# Patient Record
Sex: FEMALE | Race: WHITE | NOT HISPANIC OR LATINO
[De-identification: names, ages, dates, MRNs, and addresses within clinical notes are randomized per-mention and may not be internally consistent; named-entity substitution may affect disease eponyms.]

---

## 2017-09-26 ENCOUNTER — APPOINTMENT (OUTPATIENT)
Dept: OBGYN | Facility: CLINIC | Age: 52
End: 2017-09-26
Payer: COMMERCIAL

## 2017-09-26 VITALS
WEIGHT: 184 LBS | DIASTOLIC BLOOD PRESSURE: 80 MMHG | HEIGHT: 64 IN | SYSTOLIC BLOOD PRESSURE: 134 MMHG | BODY MASS INDEX: 31.41 KG/M2

## 2017-09-26 DIAGNOSIS — N63 UNSPECIFIED LUMP IN BREAST: ICD-10-CM

## 2017-09-26 DIAGNOSIS — N64.4 MASTODYNIA: ICD-10-CM

## 2017-09-26 PROCEDURE — 99396 PREV VISIT EST AGE 40-64: CPT

## 2017-09-26 PROCEDURE — 99214 OFFICE O/P EST MOD 30 MIN: CPT | Mod: 25

## 2017-09-28 ENCOUNTER — APPOINTMENT (OUTPATIENT)
Dept: MAMMOGRAPHY | Facility: CLINIC | Age: 52
End: 2017-09-28

## 2017-09-28 LAB — HPV HIGH+LOW RISK DNA PNL CVX: NEGATIVE

## 2017-09-29 ENCOUNTER — FORM ENCOUNTER (OUTPATIENT)
Age: 52
End: 2017-09-29

## 2017-09-30 ENCOUNTER — OUTPATIENT (OUTPATIENT)
Dept: OUTPATIENT SERVICES | Facility: HOSPITAL | Age: 52
LOS: 1 days | End: 2017-09-30
Payer: COMMERCIAL

## 2017-09-30 ENCOUNTER — APPOINTMENT (OUTPATIENT)
Dept: MAMMOGRAPHY | Facility: CLINIC | Age: 52
End: 2017-09-30
Payer: COMMERCIAL

## 2017-09-30 ENCOUNTER — APPOINTMENT (OUTPATIENT)
Dept: ULTRASOUND IMAGING | Facility: CLINIC | Age: 52
End: 2017-09-30
Payer: COMMERCIAL

## 2017-09-30 DIAGNOSIS — N63 UNSPECIFIED LUMP IN BREAST: ICD-10-CM

## 2017-09-30 DIAGNOSIS — Z00.8 ENCOUNTER FOR OTHER GENERAL EXAMINATION: ICD-10-CM

## 2017-09-30 DIAGNOSIS — N64.4 MASTODYNIA: ICD-10-CM

## 2017-09-30 PROCEDURE — G0279: CPT

## 2017-09-30 PROCEDURE — G0204: CPT | Mod: 26

## 2017-09-30 PROCEDURE — 76641 ULTRASOUND BREAST COMPLETE: CPT

## 2017-09-30 PROCEDURE — G0279: CPT | Mod: 26

## 2017-09-30 PROCEDURE — 76641 ULTRASOUND BREAST COMPLETE: CPT | Mod: 26,50

## 2017-09-30 PROCEDURE — 77066 DX MAMMO INCL CAD BI: CPT

## 2017-10-02 DIAGNOSIS — B00.9 HERPESVIRAL INFECTION, UNSPECIFIED: ICD-10-CM

## 2017-10-04 DIAGNOSIS — N60.01 SOLITARY CYST OF RIGHT BREAST: ICD-10-CM

## 2017-10-04 DIAGNOSIS — R92.8 OTHER ABNORMAL AND INCONCLUSIVE FINDINGS ON DIAGNOSTIC IMAGING OF BREAST: ICD-10-CM

## 2017-10-04 DIAGNOSIS — N64.4 MASTODYNIA: ICD-10-CM

## 2017-10-12 ENCOUNTER — APPOINTMENT (OUTPATIENT)
Dept: OBGYN | Facility: CLINIC | Age: 52
End: 2017-10-12
Payer: COMMERCIAL

## 2017-10-12 VITALS
HEIGHT: 64 IN | BODY MASS INDEX: 31.58 KG/M2 | DIASTOLIC BLOOD PRESSURE: 80 MMHG | WEIGHT: 185 LBS | SYSTOLIC BLOOD PRESSURE: 130 MMHG

## 2017-10-12 DIAGNOSIS — N92.0 EXCESSIVE AND FREQUENT MENSTRUATION WITH REGULAR CYCLE: ICD-10-CM

## 2017-10-12 LAB — CYTOLOGY CVX/VAG DOC THIN PREP: NORMAL

## 2017-10-12 PROCEDURE — 99213 OFFICE O/P EST LOW 20 MIN: CPT | Mod: 25

## 2017-10-12 PROCEDURE — 58100 BIOPSY OF UTERUS LINING: CPT

## 2017-10-12 PROCEDURE — 76857 US EXAM PELVIC LIMITED: CPT

## 2017-10-12 PROCEDURE — 76830 TRANSVAGINAL US NON-OB: CPT

## 2017-10-16 LAB — CORE LAB BIOPSY: NORMAL

## 2017-10-20 ENCOUNTER — OUTPATIENT (OUTPATIENT)
Dept: OUTPATIENT SERVICES | Facility: HOSPITAL | Age: 52
LOS: 1 days | End: 2017-10-20
Payer: COMMERCIAL

## 2017-10-20 VITALS
WEIGHT: 184.53 LBS | TEMPERATURE: 100 F | DIASTOLIC BLOOD PRESSURE: 81 MMHG | HEART RATE: 82 BPM | HEIGHT: 64 IN | SYSTOLIC BLOOD PRESSURE: 133 MMHG | RESPIRATION RATE: 16 BRPM

## 2017-10-20 DIAGNOSIS — N92.0 EXCESSIVE AND FREQUENT MENSTRUATION WITH REGULAR CYCLE: ICD-10-CM

## 2017-10-20 DIAGNOSIS — Z90.49 ACQUIRED ABSENCE OF OTHER SPECIFIED PARTS OF DIGESTIVE TRACT: Chronic | ICD-10-CM

## 2017-10-20 DIAGNOSIS — Z01.818 ENCOUNTER FOR OTHER PREPROCEDURAL EXAMINATION: ICD-10-CM

## 2017-10-20 DIAGNOSIS — Z98.890 OTHER SPECIFIED POSTPROCEDURAL STATES: Chronic | ICD-10-CM

## 2017-10-20 DIAGNOSIS — I10 ESSENTIAL (PRIMARY) HYPERTENSION: ICD-10-CM

## 2017-10-20 DIAGNOSIS — E03.9 HYPOTHYROIDISM, UNSPECIFIED: ICD-10-CM

## 2017-10-20 LAB
ANION GAP SERPL CALC-SCNC: 12 MMOL/L — SIGNIFICANT CHANGE UP (ref 5–17)
APTT BLD: 28.2 SEC — SIGNIFICANT CHANGE UP (ref 27.5–37.4)
BUN SERPL-MCNC: 14 MG/DL — SIGNIFICANT CHANGE UP (ref 8–20)
CALCIUM SERPL-MCNC: 9 MG/DL — SIGNIFICANT CHANGE UP (ref 8.6–10.2)
CHLORIDE SERPL-SCNC: 102 MMOL/L — SIGNIFICANT CHANGE UP (ref 98–107)
CO2 SERPL-SCNC: 28 MMOL/L — SIGNIFICANT CHANGE UP (ref 22–29)
CREAT SERPL-MCNC: 0.72 MG/DL — SIGNIFICANT CHANGE UP (ref 0.5–1.3)
GLUCOSE SERPL-MCNC: 100 MG/DL — SIGNIFICANT CHANGE UP (ref 70–115)
HCT VFR BLD CALC: 39 % — SIGNIFICANT CHANGE UP (ref 37–47)
HGB BLD-MCNC: 13.1 G/DL — SIGNIFICANT CHANGE UP (ref 12–16)
INR BLD: 1.03 RATIO — SIGNIFICANT CHANGE UP (ref 0.88–1.16)
MCHC RBC-ENTMCNC: 28.1 PG — SIGNIFICANT CHANGE UP (ref 27–31)
MCHC RBC-ENTMCNC: 33.6 G/DL — SIGNIFICANT CHANGE UP (ref 32–36)
MCV RBC AUTO: 83.5 FL — SIGNIFICANT CHANGE UP (ref 81–99)
PLATELET # BLD AUTO: 455 K/UL — HIGH (ref 150–400)
POTASSIUM SERPL-MCNC: 3.4 MMOL/L — LOW (ref 3.5–5.3)
POTASSIUM SERPL-SCNC: 3.4 MMOL/L — LOW (ref 3.5–5.3)
PROTHROM AB SERPL-ACNC: 11.3 SEC — SIGNIFICANT CHANGE UP (ref 9.8–12.7)
RBC # BLD: 4.67 M/UL — SIGNIFICANT CHANGE UP (ref 4.4–5.2)
RBC # FLD: 13.4 % — SIGNIFICANT CHANGE UP (ref 11–15.6)
SODIUM SERPL-SCNC: 142 MMOL/L — SIGNIFICANT CHANGE UP (ref 135–145)
WBC # BLD: 8.6 K/UL — SIGNIFICANT CHANGE UP (ref 4.8–10.8)
WBC # FLD AUTO: 8.6 K/UL — SIGNIFICANT CHANGE UP (ref 4.8–10.8)

## 2017-10-20 PROCEDURE — 80048 BASIC METABOLIC PNL TOTAL CA: CPT

## 2017-10-20 PROCEDURE — 85610 PROTHROMBIN TIME: CPT

## 2017-10-20 PROCEDURE — 85730 THROMBOPLASTIN TIME PARTIAL: CPT

## 2017-10-20 PROCEDURE — 93005 ELECTROCARDIOGRAM TRACING: CPT

## 2017-10-20 PROCEDURE — 36415 COLL VENOUS BLD VENIPUNCTURE: CPT

## 2017-10-20 PROCEDURE — 85027 COMPLETE CBC AUTOMATED: CPT

## 2017-10-20 PROCEDURE — 93010 ELECTROCARDIOGRAM REPORT: CPT

## 2017-10-20 PROCEDURE — G0463: CPT

## 2017-10-20 RX ORDER — SODIUM CHLORIDE 9 MG/ML
3 INJECTION INTRAMUSCULAR; INTRAVENOUS; SUBCUTANEOUS ONCE
Qty: 0 | Refills: 0 | Status: DISCONTINUED | OUTPATIENT
Start: 2017-11-01 | End: 2017-11-16

## 2017-10-20 NOTE — H&P PST ADULT - RS GEN PE MLT RESP DETAILS PC
normal/diminished breath sounds, R/airway patent/respirations non-labored/diminished breath sounds, L

## 2017-10-20 NOTE — H&P PST ADULT - NSANTHOSAYNRD_GEN_A_CORE
No. SAMMIE screening performed.  STOP BANG Legend: 0-2 = LOW Risk; 3-4 = INTERMEDIATE Risk; 5-8 = HIGH Risk

## 2017-10-20 NOTE — ASU PATIENT PROFILE, ADULT - VISION (WITH CORRECTIVE LENSES IF THE PATIENT USUALLY WEARS THEM):
Partially impaired: cannot see medication labels or newsprint, but can see obstacles in path, and the surrounding layout; can count fingers at arm's length/wears glasses  contacts

## 2017-10-20 NOTE — H&P PST ADULT - PMH
Anemia    Asthma    Gastric ulcer    Hypertension    Hypothyroidism    Risk factors for obstructive sleep apnea

## 2017-11-01 ENCOUNTER — OUTPATIENT (OUTPATIENT)
Dept: OUTPATIENT SERVICES | Facility: HOSPITAL | Age: 52
LOS: 1 days | End: 2017-11-01
Payer: COMMERCIAL

## 2017-11-01 ENCOUNTER — RESULT REVIEW (OUTPATIENT)
Age: 52
End: 2017-11-01

## 2017-11-01 VITALS
OXYGEN SATURATION: 99 % | HEART RATE: 68 BPM | DIASTOLIC BLOOD PRESSURE: 78 MMHG | RESPIRATION RATE: 16 BRPM | SYSTOLIC BLOOD PRESSURE: 154 MMHG | WEIGHT: 184.09 LBS | TEMPERATURE: 98 F | HEIGHT: 64 IN

## 2017-11-01 VITALS
TEMPERATURE: 98 F | SYSTOLIC BLOOD PRESSURE: 130 MMHG | RESPIRATION RATE: 16 BRPM | DIASTOLIC BLOOD PRESSURE: 65 MMHG | HEART RATE: 80 BPM | OXYGEN SATURATION: 100 %

## 2017-11-01 DIAGNOSIS — Z98.890 OTHER SPECIFIED POSTPROCEDURAL STATES: Chronic | ICD-10-CM

## 2017-11-01 DIAGNOSIS — Z90.49 ACQUIRED ABSENCE OF OTHER SPECIFIED PARTS OF DIGESTIVE TRACT: Chronic | ICD-10-CM

## 2017-11-01 DIAGNOSIS — N92.0 EXCESSIVE AND FREQUENT MENSTRUATION WITH REGULAR CYCLE: ICD-10-CM

## 2017-11-01 PROCEDURE — 88305 TISSUE EXAM BY PATHOLOGIST: CPT | Mod: 26

## 2017-11-01 PROCEDURE — 58563 HYSTEROSCOPY ABLATION: CPT

## 2017-11-01 PROCEDURE — 88305 TISSUE EXAM BY PATHOLOGIST: CPT

## 2017-11-01 RX ORDER — FENTANYL CITRATE 50 UG/ML
50 INJECTION INTRAVENOUS
Qty: 0 | Refills: 0 | Status: DISCONTINUED | OUTPATIENT
Start: 2017-11-01 | End: 2017-11-01

## 2017-11-01 RX ORDER — LEVOTHYROXINE SODIUM 125 MCG
1 TABLET ORAL
Qty: 0 | Refills: 0 | COMMUNITY

## 2017-11-01 RX ORDER — ALBUTEROL 90 UG/1
0 AEROSOL, METERED ORAL
Qty: 0 | Refills: 0 | COMMUNITY

## 2017-11-01 RX ORDER — FLUTICASONE PROPIONATE AND SALMETEROL 50; 250 UG/1; UG/1
0 POWDER ORAL; RESPIRATORY (INHALATION)
Qty: 0 | Refills: 0 | COMMUNITY

## 2017-11-01 RX ORDER — AMLODIPINE BESYLATE 2.5 MG/1
0 TABLET ORAL
Qty: 0 | Refills: 0 | COMMUNITY

## 2017-11-01 RX ORDER — ONDANSETRON 8 MG/1
4 TABLET, FILM COATED ORAL ONCE
Qty: 0 | Refills: 0 | Status: DISCONTINUED | OUTPATIENT
Start: 2017-11-01 | End: 2017-11-01

## 2017-11-01 RX ORDER — SODIUM CHLORIDE 9 MG/ML
1000 INJECTION INTRAMUSCULAR; INTRAVENOUS; SUBCUTANEOUS
Qty: 0 | Refills: 0 | Status: DISCONTINUED | OUTPATIENT
Start: 2017-11-01 | End: 2017-11-01

## 2017-11-01 NOTE — ASU DISCHARGE PLAN (ADULT/PEDIATRIC). - NOTIFY
Bleeding that does not stop/GYN Fever>100.4/Fever greater than 101/Inability to Tolerate Liquids or Foods

## 2017-11-01 NOTE — BRIEF OPERATIVE NOTE - PROCEDURE
<<-----Click on this checkbox to enter Procedure Hysteroscopy with ablation of endometrium using bipolar electrosurgical system  11/01/2017  with dilation and currettage  Active  [email or web address]  Endometrial ablation  11/01/2017    Active  [email or web address]

## 2017-11-01 NOTE — ASU DISCHARGE PLAN (ADULT/PEDIATRIC). - MEDICATION SUMMARY - MEDICATIONS TO TAKE
I will START or STAY ON the medications listed below when I get home from the hospital:    Percocet 5/325 oral tablet  -- 1 tab(s) by mouth every 6 hours MDD:4 tabs  -- Caution federal law prohibits the transfer of this drug to any person other  than the person for whom it was prescribed.  May cause drowsiness.  Alcohol may intensify this effect.  Use care when operating dangerous machinery.  This prescription cannot be refilled.  This product contains acetaminophen.  Do not use  with any other product containing acetaminophen to prevent possible liver damage.  Using more of this medication than prescribed may cause serious breathing problems.    -- Indication: For pain

## 2017-11-02 ENCOUNTER — TRANSCRIPTION ENCOUNTER (OUTPATIENT)
Age: 52
End: 2017-11-02

## 2017-11-03 DIAGNOSIS — N71.9 INFLAMMATORY DISEASE OF UTERUS, UNSPECIFIED: ICD-10-CM

## 2017-11-06 LAB — SURGICAL PATHOLOGY FINAL REPORT - CH: SIGNIFICANT CHANGE UP

## 2017-11-16 ENCOUNTER — APPOINTMENT (OUTPATIENT)
Dept: OBGYN | Facility: CLINIC | Age: 52
End: 2017-11-16
Payer: COMMERCIAL

## 2017-11-16 VITALS
SYSTOLIC BLOOD PRESSURE: 130 MMHG | HEIGHT: 64 IN | DIASTOLIC BLOOD PRESSURE: 70 MMHG | BODY MASS INDEX: 31.41 KG/M2 | WEIGHT: 184 LBS

## 2017-11-16 PROCEDURE — 99024 POSTOP FOLLOW-UP VISIT: CPT

## 2018-08-16 PROBLEM — E03.9 HYPOTHYROIDISM, UNSPECIFIED: Chronic | Status: ACTIVE | Noted: 2017-10-20

## 2018-08-16 PROBLEM — K25.9 GASTRIC ULCER, UNSPECIFIED AS ACUTE OR CHRONIC, WITHOUT HEMORRHAGE OR PERFORATION: Chronic | Status: ACTIVE | Noted: 2017-10-20

## 2018-08-16 PROBLEM — I10 ESSENTIAL (PRIMARY) HYPERTENSION: Chronic | Status: ACTIVE | Noted: 2017-10-20

## 2018-08-16 PROBLEM — Z91.89 OTHER SPECIFIED PERSONAL RISK FACTORS, NOT ELSEWHERE CLASSIFIED: Chronic | Status: ACTIVE | Noted: 2017-10-20

## 2018-08-16 PROBLEM — J45.909 UNSPECIFIED ASTHMA, UNCOMPLICATED: Chronic | Status: ACTIVE | Noted: 2017-10-20

## 2018-08-16 PROBLEM — D64.9 ANEMIA, UNSPECIFIED: Chronic | Status: ACTIVE | Noted: 2017-10-20

## 2018-08-17 ENCOUNTER — APPOINTMENT (OUTPATIENT)
Dept: OBGYN | Facility: CLINIC | Age: 53
End: 2018-08-17
Payer: COMMERCIAL

## 2018-08-17 VITALS
WEIGHT: 184 LBS | SYSTOLIC BLOOD PRESSURE: 120 MMHG | DIASTOLIC BLOOD PRESSURE: 80 MMHG | HEIGHT: 64 IN | BODY MASS INDEX: 31.41 KG/M2

## 2018-08-17 DIAGNOSIS — D25.9 LEIOMYOMA OF UTERUS, UNSPECIFIED: ICD-10-CM

## 2018-08-17 PROCEDURE — 99213 OFFICE O/P EST LOW 20 MIN: CPT

## 2018-08-28 ENCOUNTER — APPOINTMENT (OUTPATIENT)
Dept: OBGYN | Facility: CLINIC | Age: 53
End: 2018-08-28
Payer: COMMERCIAL

## 2018-08-28 ENCOUNTER — MEDICATION RENEWAL (OUTPATIENT)
Age: 53
End: 2018-08-28

## 2018-08-28 VITALS
BODY MASS INDEX: 31.41 KG/M2 | SYSTOLIC BLOOD PRESSURE: 120 MMHG | HEIGHT: 64 IN | WEIGHT: 184 LBS | DIASTOLIC BLOOD PRESSURE: 80 MMHG

## 2018-08-28 PROCEDURE — 99213 OFFICE O/P EST LOW 20 MIN: CPT

## 2018-09-04 LAB — BACTERIA GENITAL AEROBE CULT: ABNORMAL

## 2018-09-26 ENCOUNTER — APPOINTMENT (OUTPATIENT)
Dept: OBGYN | Facility: CLINIC | Age: 53
End: 2018-09-26

## 2018-10-03 ENCOUNTER — FORM ENCOUNTER (OUTPATIENT)
Age: 53
End: 2018-10-03

## 2018-10-03 ENCOUNTER — APPOINTMENT (OUTPATIENT)
Dept: OBGYN | Facility: CLINIC | Age: 53
End: 2018-10-03
Payer: COMMERCIAL

## 2018-10-03 VITALS
WEIGHT: 184 LBS | DIASTOLIC BLOOD PRESSURE: 74 MMHG | HEIGHT: 64 IN | SYSTOLIC BLOOD PRESSURE: 126 MMHG | BODY MASS INDEX: 31.41 KG/M2

## 2018-10-03 DIAGNOSIS — A63.0 ANOGENITAL (VENEREAL) WARTS: ICD-10-CM

## 2018-10-03 PROCEDURE — 99396 PREV VISIT EST AGE 40-64: CPT

## 2018-10-03 PROCEDURE — 82270 OCCULT BLOOD FECES: CPT

## 2018-10-04 ENCOUNTER — APPOINTMENT (OUTPATIENT)
Dept: MAMMOGRAPHY | Facility: CLINIC | Age: 53
End: 2018-10-04
Payer: COMMERCIAL

## 2018-10-04 ENCOUNTER — OUTPATIENT (OUTPATIENT)
Dept: OUTPATIENT SERVICES | Facility: HOSPITAL | Age: 53
LOS: 1 days | End: 2018-10-04
Payer: COMMERCIAL

## 2018-10-04 DIAGNOSIS — Z98.890 OTHER SPECIFIED POSTPROCEDURAL STATES: Chronic | ICD-10-CM

## 2018-10-04 DIAGNOSIS — Z90.49 ACQUIRED ABSENCE OF OTHER SPECIFIED PARTS OF DIGESTIVE TRACT: Chronic | ICD-10-CM

## 2018-10-04 DIAGNOSIS — Z00.00 ENCOUNTER FOR GENERAL ADULT MEDICAL EXAMINATION WITHOUT ABNORMAL FINDINGS: ICD-10-CM

## 2018-10-04 PROCEDURE — 77063 BREAST TOMOSYNTHESIS BI: CPT | Mod: 26

## 2018-10-04 PROCEDURE — 77067 SCR MAMMO BI INCL CAD: CPT

## 2018-10-04 PROCEDURE — 77063 BREAST TOMOSYNTHESIS BI: CPT

## 2018-10-04 PROCEDURE — 77067 SCR MAMMO BI INCL CAD: CPT | Mod: 26

## 2018-10-05 LAB
C TRACH RRNA SPEC QL NAA+PROBE: NOT DETECTED
HPV HIGH+LOW RISK DNA PNL CVX: DETECTED
N GONORRHOEA RRNA SPEC QL NAA+PROBE: NOT DETECTED
SOURCE AMPLIFICATION: NORMAL
SOURCE TP AMPLIFICATION: NORMAL
T VAGINALIS RRNA SPEC QL NAA+PROBE: NOT DETECTED

## 2018-10-08 LAB — CYTOLOGY CVX/VAG DOC THIN PREP: NORMAL

## 2018-11-05 ENCOUNTER — APPOINTMENT (OUTPATIENT)
Dept: OBGYN | Facility: CLINIC | Age: 53
End: 2018-11-05
Payer: COMMERCIAL

## 2018-11-05 VITALS
DIASTOLIC BLOOD PRESSURE: 74 MMHG | HEIGHT: 64 IN | SYSTOLIC BLOOD PRESSURE: 130 MMHG | BODY MASS INDEX: 31.41 KG/M2 | WEIGHT: 184 LBS

## 2018-11-05 PROCEDURE — 99214 OFFICE O/P EST MOD 30 MIN: CPT

## 2019-04-05 ENCOUNTER — APPOINTMENT (OUTPATIENT)
Dept: OBGYN | Facility: CLINIC | Age: 54
End: 2019-04-05
Payer: COMMERCIAL

## 2019-04-05 VITALS
HEIGHT: 64 IN | SYSTOLIC BLOOD PRESSURE: 130 MMHG | DIASTOLIC BLOOD PRESSURE: 80 MMHG | WEIGHT: 184 LBS | BODY MASS INDEX: 31.41 KG/M2

## 2019-04-05 PROCEDURE — 99213 OFFICE O/P EST LOW 20 MIN: CPT

## 2019-04-10 LAB
CYTOLOGY CVX/VAG DOC THIN PREP: NORMAL
HPV HIGH+LOW RISK DNA PNL CVX: NOT DETECTED

## 2019-10-21 ENCOUNTER — APPOINTMENT (OUTPATIENT)
Dept: OBGYN | Facility: CLINIC | Age: 54
End: 2019-10-21
Payer: COMMERCIAL

## 2019-10-21 VITALS
SYSTOLIC BLOOD PRESSURE: 125 MMHG | HEIGHT: 64 IN | DIASTOLIC BLOOD PRESSURE: 80 MMHG | BODY MASS INDEX: 31.41 KG/M2 | WEIGHT: 184 LBS

## 2019-10-21 DIAGNOSIS — Z00.00 ENCOUNTER FOR GENERAL ADULT MEDICAL EXAMINATION W/OUT ABNORMAL FINDINGS: ICD-10-CM

## 2019-10-21 DIAGNOSIS — R21 RASH AND OTHER NONSPECIFIC SKIN ERUPTION: ICD-10-CM

## 2019-10-21 DIAGNOSIS — Z86.19 PERSONAL HISTORY OF OTHER INFECTIOUS AND PARASITIC DISEASES: ICD-10-CM

## 2019-10-21 PROCEDURE — 99214 OFFICE O/P EST MOD 30 MIN: CPT | Mod: 25

## 2019-10-21 PROCEDURE — 99396 PREV VISIT EST AGE 40-64: CPT

## 2019-10-21 NOTE — PHYSICAL EXAM
[Awake] : awake [Alert] : alert [Acute Distress] : no acute distress [Mass] : no breast mass [Nipple Discharge] : no nipple discharge [Axillary LAD] : no axillary lymphadenopathy [Soft] : soft [Oriented x3] : oriented to person, place, and time [Tender] : non tender [Normal] : vagina [No Bleeding] : there was no active vaginal bleeding [Uterine Adnexae] : were not tender and not enlarged [Occult Blood] : occult blood test from digital rectal exam was negative [CTAB] : CTAB [RRR, No Murmurs] : RRR, no murmurs [de-identified] : tender, erythematous nodule left buttock (cultured)

## 2019-10-24 LAB
BACTERIA SPEC CULT: NORMAL
HPV HIGH+LOW RISK DNA PNL CVX: NOT DETECTED
HSV+VZV DNA SPEC QL NAA+PROBE: NOT DETECTED
SPECIMEN SOURCE: NORMAL

## 2019-10-28 LAB — CYTOLOGY CVX/VAG DOC THIN PREP: NORMAL

## 2019-12-19 ENCOUNTER — APPOINTMENT (OUTPATIENT)
Dept: OPHTHALMOLOGY | Facility: CLINIC | Age: 54
End: 2019-12-19
Payer: COMMERCIAL

## 2019-12-19 ENCOUNTER — NON-APPOINTMENT (OUTPATIENT)
Age: 54
End: 2019-12-19

## 2019-12-19 PROCEDURE — 92002 INTRM OPH EXAM NEW PATIENT: CPT

## 2019-12-26 ENCOUNTER — NON-APPOINTMENT (OUTPATIENT)
Age: 54
End: 2019-12-26

## 2019-12-26 ENCOUNTER — APPOINTMENT (OUTPATIENT)
Dept: OPHTHALMOLOGY | Facility: CLINIC | Age: 54
End: 2019-12-26
Payer: COMMERCIAL

## 2019-12-26 PROCEDURE — 92014 COMPRE OPH EXAM EST PT 1/>: CPT

## 2020-01-06 ENCOUNTER — APPOINTMENT (OUTPATIENT)
Dept: OBGYN | Facility: CLINIC | Age: 55
End: 2020-01-06
Payer: COMMERCIAL

## 2020-01-06 VITALS
BODY MASS INDEX: 31.24 KG/M2 | HEIGHT: 64 IN | DIASTOLIC BLOOD PRESSURE: 70 MMHG | SYSTOLIC BLOOD PRESSURE: 114 MMHG | WEIGHT: 183 LBS

## 2020-01-06 PROCEDURE — 99213 OFFICE O/P EST LOW 20 MIN: CPT

## 2020-11-05 ENCOUNTER — NON-APPOINTMENT (OUTPATIENT)
Age: 55
End: 2020-11-05

## 2020-11-05 ENCOUNTER — APPOINTMENT (OUTPATIENT)
Dept: OPHTHALMOLOGY | Facility: CLINIC | Age: 55
End: 2020-11-05
Payer: COMMERCIAL

## 2020-11-05 PROCEDURE — 99072 ADDL SUPL MATRL&STAF TM PHE: CPT

## 2020-11-05 PROCEDURE — 92012 INTRM OPH EXAM EST PATIENT: CPT

## 2020-11-12 ENCOUNTER — APPOINTMENT (OUTPATIENT)
Dept: OBGYN | Facility: CLINIC | Age: 55
End: 2020-11-12
Payer: COMMERCIAL

## 2020-11-12 VITALS
HEIGHT: 64 IN | WEIGHT: 188 LBS | BODY MASS INDEX: 32.1 KG/M2 | SYSTOLIC BLOOD PRESSURE: 117 MMHG | HEART RATE: 84 BPM | DIASTOLIC BLOOD PRESSURE: 76 MMHG

## 2020-11-12 DIAGNOSIS — Z12.39 ENCOUNTER FOR OTHER SCREENING FOR MALIGNANT NEOPLASM OF BREAST: ICD-10-CM

## 2020-11-12 PROCEDURE — 99396 PREV VISIT EST AGE 40-64: CPT

## 2020-11-12 PROCEDURE — 82270 OCCULT BLOOD FECES: CPT

## 2020-11-12 PROCEDURE — 99072 ADDL SUPL MATRL&STAF TM PHE: CPT

## 2020-11-12 NOTE — HISTORY OF PRESENT ILLNESS
[FreeTextEntry1] : 56 yo here for av., Pt has hot flashes, sleep issues,  she uses lotrison occ. SHe has been working in her car a lot  during covid. Pt gets leg cramps. [Patient reported mammogram was normal] : Patient reported mammogram was normal [Patient reported PAP Smear was normal] : Patient reported PAP Smear was normal [Mammogramdate] : 2018 [PapSmeardate] : 2019 [BoneDensityDate] : never [ColonoscopyDate] : yrs ago

## 2020-11-12 NOTE — PHYSICAL EXAM

## 2020-11-12 NOTE — DISCUSSION/SUMMARY
[FreeTextEntry1] : Discussed with the pt the importance of exercise weight bearing,yoga, aerobics good variety, \par calcium totalling  mg between food and vitamins also vitamin D 2000iu daily, fish oil. \par ALso that any drop of blood after menapause is not good. \par FU in one year. \par  Discussed vaginal lubricants OTC like luvena moisturizers,ky ovule and relplense . There are also vaginal extrogens, and coconut oil with intercourse.\par discussed colonoscopy and derma appt.\par Pt will start the HT\par

## 2020-11-14 LAB — HPV HIGH+LOW RISK DNA PNL CVX: NOT DETECTED

## 2020-11-16 LAB — CYTOLOGY CVX/VAG DOC THIN PREP: ABNORMAL

## 2020-11-23 ENCOUNTER — NON-APPOINTMENT (OUTPATIENT)
Age: 55
End: 2020-11-23

## 2020-11-23 ENCOUNTER — APPOINTMENT (OUTPATIENT)
Dept: OPHTHALMOLOGY | Facility: CLINIC | Age: 55
End: 2020-11-23
Payer: COMMERCIAL

## 2020-11-23 PROCEDURE — 68761 CLOSE TEAR DUCT OPENING: CPT | Mod: E2,E4

## 2020-11-23 PROCEDURE — 92012 INTRM OPH EXAM EST PATIENT: CPT | Mod: 25

## 2020-12-21 PROBLEM — Z86.19 HISTORY OF CANDIDIASIS OF VAGINA: Status: RESOLVED | Noted: 2017-11-16 | Resolved: 2020-12-21

## 2020-12-22 ENCOUNTER — APPOINTMENT (OUTPATIENT)
Dept: OPHTHALMOLOGY | Facility: CLINIC | Age: 55
End: 2020-12-22
Payer: COMMERCIAL

## 2020-12-22 ENCOUNTER — NON-APPOINTMENT (OUTPATIENT)
Age: 55
End: 2020-12-22

## 2020-12-22 PROCEDURE — 92012 INTRM OPH EXAM EST PATIENT: CPT

## 2020-12-22 PROCEDURE — 99072 ADDL SUPL MATRL&STAF TM PHE: CPT

## 2021-02-22 ENCOUNTER — APPOINTMENT (OUTPATIENT)
Dept: OPHTHALMOLOGY | Facility: CLINIC | Age: 56
End: 2021-02-22

## 2021-03-03 ENCOUNTER — RESULT REVIEW (OUTPATIENT)
Age: 56
End: 2021-03-03

## 2021-03-03 ENCOUNTER — APPOINTMENT (OUTPATIENT)
Dept: MAMMOGRAPHY | Facility: CLINIC | Age: 56
End: 2021-03-03
Payer: COMMERCIAL

## 2021-03-03 ENCOUNTER — APPOINTMENT (OUTPATIENT)
Dept: ULTRASOUND IMAGING | Facility: CLINIC | Age: 56
End: 2021-03-03
Payer: COMMERCIAL

## 2021-03-03 ENCOUNTER — OUTPATIENT (OUTPATIENT)
Dept: OUTPATIENT SERVICES | Facility: HOSPITAL | Age: 56
LOS: 1 days | End: 2021-03-03
Payer: COMMERCIAL

## 2021-03-03 ENCOUNTER — APPOINTMENT (OUTPATIENT)
Dept: RADIOLOGY | Facility: CLINIC | Age: 56
End: 2021-03-03
Payer: COMMERCIAL

## 2021-03-03 DIAGNOSIS — Z13.820 ENCOUNTER FOR SCREENING FOR OSTEOPOROSIS: ICD-10-CM

## 2021-03-03 DIAGNOSIS — Z00.8 ENCOUNTER FOR OTHER GENERAL EXAMINATION: ICD-10-CM

## 2021-03-03 DIAGNOSIS — Z90.49 ACQUIRED ABSENCE OF OTHER SPECIFIED PARTS OF DIGESTIVE TRACT: Chronic | ICD-10-CM

## 2021-03-03 DIAGNOSIS — Z98.890 OTHER SPECIFIED POSTPROCEDURAL STATES: Chronic | ICD-10-CM

## 2021-03-03 DIAGNOSIS — R92.8 OTHER ABNORMAL AND INCONCLUSIVE FINDINGS ON DIAGNOSTIC IMAGING OF BREAST: ICD-10-CM

## 2021-03-03 PROCEDURE — 77067 SCR MAMMO BI INCL CAD: CPT

## 2021-03-03 PROCEDURE — 77067 SCR MAMMO BI INCL CAD: CPT | Mod: 26

## 2021-03-03 PROCEDURE — 77063 BREAST TOMOSYNTHESIS BI: CPT | Mod: 26

## 2021-03-03 PROCEDURE — 77063 BREAST TOMOSYNTHESIS BI: CPT

## 2021-03-03 PROCEDURE — 76641 ULTRASOUND BREAST COMPLETE: CPT

## 2021-03-03 PROCEDURE — 77080 DXA BONE DENSITY AXIAL: CPT | Mod: 26

## 2021-03-03 PROCEDURE — 76641 ULTRASOUND BREAST COMPLETE: CPT | Mod: 26,50

## 2021-03-03 PROCEDURE — 77080 DXA BONE DENSITY AXIAL: CPT

## 2021-06-10 ENCOUNTER — APPOINTMENT (OUTPATIENT)
Dept: OPHTHALMOLOGY | Facility: CLINIC | Age: 56
End: 2021-06-10
Payer: COMMERCIAL

## 2021-06-10 ENCOUNTER — NON-APPOINTMENT (OUTPATIENT)
Age: 56
End: 2021-06-10

## 2021-06-10 PROCEDURE — 92012 INTRM OPH EXAM EST PATIENT: CPT | Mod: 25

## 2021-06-10 PROCEDURE — 68761 CLOSE TEAR DUCT OPENING: CPT | Mod: E2,E4

## 2021-06-10 PROCEDURE — 99072 ADDL SUPL MATRL&STAF TM PHE: CPT

## 2021-07-12 ENCOUNTER — APPOINTMENT (OUTPATIENT)
Dept: OPHTHALMOLOGY | Facility: CLINIC | Age: 56
End: 2021-07-12
Payer: COMMERCIAL

## 2021-07-12 ENCOUNTER — NON-APPOINTMENT (OUTPATIENT)
Age: 56
End: 2021-07-12

## 2021-07-12 PROCEDURE — 99072 ADDL SUPL MATRL&STAF TM PHE: CPT

## 2021-07-12 PROCEDURE — 92014 COMPRE OPH EXAM EST PT 1/>: CPT

## 2021-11-23 ENCOUNTER — NON-APPOINTMENT (OUTPATIENT)
Age: 56
End: 2021-11-23

## 2021-11-23 ENCOUNTER — APPOINTMENT (OUTPATIENT)
Dept: OPHTHALMOLOGY | Facility: CLINIC | Age: 56
End: 2021-11-23
Payer: COMMERCIAL

## 2021-11-23 PROCEDURE — 92012 INTRM OPH EXAM EST PATIENT: CPT | Mod: 25

## 2021-11-23 PROCEDURE — 68761 CLOSE TEAR DUCT OPENING: CPT | Mod: E2,E4

## 2022-01-11 ENCOUNTER — NON-APPOINTMENT (OUTPATIENT)
Age: 57
End: 2022-01-11

## 2022-01-11 ENCOUNTER — APPOINTMENT (OUTPATIENT)
Dept: OBGYN | Facility: CLINIC | Age: 57
End: 2022-01-11
Payer: COMMERCIAL

## 2022-01-11 VITALS
SYSTOLIC BLOOD PRESSURE: 128 MMHG | BODY MASS INDEX: 31.92 KG/M2 | HEART RATE: 80 BPM | WEIGHT: 187 LBS | HEIGHT: 64 IN | DIASTOLIC BLOOD PRESSURE: 79 MMHG

## 2022-01-11 DIAGNOSIS — N95.1 MENOPAUSAL AND FEMALE CLIMACTERIC STATES: ICD-10-CM

## 2022-01-11 DIAGNOSIS — Z12.11 ENCOUNTER FOR SCREENING FOR MALIGNANT NEOPLASM OF COLON: ICD-10-CM

## 2022-01-11 DIAGNOSIS — B97.7 PAPILLOMAVIRUS AS THE CAUSE OF DISEASES CLASSIFIED ELSEWHERE: ICD-10-CM

## 2022-01-11 PROCEDURE — 99396 PREV VISIT EST AGE 40-64: CPT

## 2022-01-11 PROCEDURE — 82270 OCCULT BLOOD FECES: CPT

## 2022-01-11 NOTE — PHYSICAL EXAM

## 2022-01-11 NOTE — DISCUSSION/SUMMARY
[FreeTextEntry1] : Discussed with the pt the importance of exercise weight bearing,yoga, aerobics good variety, \par calcium totalling  mg between food and vitamins also vitamin D 2000iu daily, fish oil. \par ALso that any drop of blood after menapause is not good. \par FU in one year. \par .\par discussed colonoscopy and derma appt.\par refill prempro rbad. \par

## 2022-01-11 NOTE — HISTORY OF PRESENT ILLNESS
[Currently Active] : currently active [Men] : men [Vaginal] : vaginal [No] : No [FreeTextEntry1] : 55 yo  was on prempro about 6 weeks   ran out  also got booster 2 weeks ago. Since booster she has hot flashes and headaches every morning and hot flashes at night. 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  uses valtrex [Mammogramdate] : 3/2021 [BreastSonogramDate] : 3/2021 [ColonoscopyDate] : ?50

## 2022-01-13 LAB — HPV HIGH+LOW RISK DNA PNL CVX: NOT DETECTED

## 2022-01-18 LAB — CYTOLOGY CVX/VAG DOC THIN PREP: NORMAL

## 2022-03-24 ENCOUNTER — NON-APPOINTMENT (OUTPATIENT)
Age: 57
End: 2022-03-24

## 2022-03-24 ENCOUNTER — APPOINTMENT (OUTPATIENT)
Dept: OPHTHALMOLOGY | Facility: CLINIC | Age: 57
End: 2022-03-24
Payer: COMMERCIAL

## 2022-03-24 PROCEDURE — 92012 INTRM OPH EXAM EST PATIENT: CPT | Mod: 25

## 2022-03-24 PROCEDURE — 68761 CLOSE TEAR DUCT OPENING: CPT | Mod: E1,E2,E3,E4

## 2022-07-26 ENCOUNTER — NON-APPOINTMENT (OUTPATIENT)
Age: 57
End: 2022-07-26

## 2022-07-26 ENCOUNTER — APPOINTMENT (OUTPATIENT)
Dept: OPHTHALMOLOGY | Facility: CLINIC | Age: 57
End: 2022-07-26

## 2022-07-26 PROCEDURE — 92014 COMPRE OPH EXAM EST PT 1/>: CPT | Mod: 25

## 2022-07-26 PROCEDURE — 68761 CLOSE TEAR DUCT OPENING: CPT

## 2022-09-10 ENCOUNTER — EMERGENCY (EMERGENCY)
Facility: HOSPITAL | Age: 57
LOS: 1 days | Discharge: HOME | End: 2022-09-10
Admitting: EMERGENCY MEDICINE

## 2022-09-10 DIAGNOSIS — U07.1 COVID-19: ICD-10-CM

## 2022-09-10 DIAGNOSIS — Z98.890 OTHER SPECIFIED POSTPROCEDURAL STATES: Chronic | ICD-10-CM

## 2022-09-10 DIAGNOSIS — R10.9 UNSPECIFIED ABDOMINAL PAIN: ICD-10-CM

## 2022-09-10 DIAGNOSIS — Z90.49 ACQUIRED ABSENCE OF OTHER SPECIFIED PARTS OF DIGESTIVE TRACT: Chronic | ICD-10-CM

## 2022-09-10 DIAGNOSIS — I10 ESSENTIAL (PRIMARY) HYPERTENSION: ICD-10-CM

## 2022-09-10 DIAGNOSIS — E78.5 HYPERLIPIDEMIA, UNSPECIFIED: ICD-10-CM

## 2022-09-10 PROCEDURE — 99285 EMERGENCY DEPT VISIT HI MDM: CPT

## 2022-09-10 PROCEDURE — 74176 CT ABD & PELVIS W/O CONTRAST: CPT | Mod: 26,MA

## 2022-09-10 PROCEDURE — 71045 X-RAY EXAM CHEST 1 VIEW: CPT | Mod: 26

## 2022-10-04 ENCOUNTER — APPOINTMENT (OUTPATIENT)
Dept: OBGYN | Facility: CLINIC | Age: 57
End: 2022-10-04

## 2022-10-04 VITALS
HEIGHT: 64 IN | WEIGHT: 185 LBS | BODY MASS INDEX: 31.58 KG/M2 | DIASTOLIC BLOOD PRESSURE: 80 MMHG | SYSTOLIC BLOOD PRESSURE: 136 MMHG

## 2022-10-04 DIAGNOSIS — R31.9 HEMATURIA, UNSPECIFIED: ICD-10-CM

## 2022-10-04 DIAGNOSIS — R10.2 PELVIC AND PERINEAL PAIN: ICD-10-CM

## 2022-10-04 PROCEDURE — 99214 OFFICE O/P EST MOD 30 MIN: CPT

## 2022-10-04 NOTE — HISTORY OF PRESENT ILLNESS
[FreeTextEntry1] : 58 yo states that she  had bad right kidney pain and hematuria. \par SHe had a lot of testing in the ER.\par She was told the pain was not a stone intermittant  but prob covid. SHe has been treated with inhailers and she is on doxycycleine and a medrol dose pack. SHe had a cough and is feeling much better now. she also had the lym rash  but is on doxy for that too. \par Last vag sono was 2017 when she had an ablation and fibroid removal and d and c\par NO prob or complaints today but er said she had a fibroid

## 2022-10-04 NOTE — DISCUSSION/SUMMARY
[FreeTextEntry1] : rtn for a vag sono for fibroids\par renal for stones\par  she will call PMD if any covid related sx.\par \par

## 2022-10-21 ENCOUNTER — RESULT REVIEW (OUTPATIENT)
Age: 57
End: 2022-10-21

## 2022-10-21 ENCOUNTER — OUTPATIENT (OUTPATIENT)
Dept: OUTPATIENT SERVICES | Facility: HOSPITAL | Age: 57
LOS: 1 days | End: 2022-10-21
Payer: COMMERCIAL

## 2022-10-21 ENCOUNTER — APPOINTMENT (OUTPATIENT)
Dept: MAMMOGRAPHY | Facility: CLINIC | Age: 57
End: 2022-10-21

## 2022-10-21 ENCOUNTER — APPOINTMENT (OUTPATIENT)
Dept: ULTRASOUND IMAGING | Facility: CLINIC | Age: 57
End: 2022-10-21

## 2022-10-21 DIAGNOSIS — Z98.890 OTHER SPECIFIED POSTPROCEDURAL STATES: Chronic | ICD-10-CM

## 2022-10-21 DIAGNOSIS — Z90.49 ACQUIRED ABSENCE OF OTHER SPECIFIED PARTS OF DIGESTIVE TRACT: Chronic | ICD-10-CM

## 2022-10-21 DIAGNOSIS — R10.2 PELVIC AND PERINEAL PAIN: ICD-10-CM

## 2022-10-21 PROCEDURE — 76830 TRANSVAGINAL US NON-OB: CPT | Mod: 26

## 2022-10-21 PROCEDURE — 76775 US EXAM ABDO BACK WALL LIM: CPT | Mod: 26

## 2022-10-21 PROCEDURE — 77067 SCR MAMMO BI INCL CAD: CPT

## 2022-10-21 PROCEDURE — 76641 ULTRASOUND BREAST COMPLETE: CPT | Mod: 26,50

## 2022-10-21 PROCEDURE — 77063 BREAST TOMOSYNTHESIS BI: CPT | Mod: 26

## 2022-10-21 PROCEDURE — 77067 SCR MAMMO BI INCL CAD: CPT | Mod: 26

## 2022-10-21 PROCEDURE — 77063 BREAST TOMOSYNTHESIS BI: CPT

## 2022-10-21 PROCEDURE — 76830 TRANSVAGINAL US NON-OB: CPT

## 2022-10-21 PROCEDURE — 76641 ULTRASOUND BREAST COMPLETE: CPT

## 2022-10-21 PROCEDURE — 76775 US EXAM ABDO BACK WALL LIM: CPT

## 2022-11-01 ENCOUNTER — APPOINTMENT (OUTPATIENT)
Dept: OBGYN | Facility: CLINIC | Age: 57
End: 2022-11-01

## 2022-11-14 ENCOUNTER — APPOINTMENT (OUTPATIENT)
Dept: OBGYN | Facility: CLINIC | Age: 57
End: 2022-11-14

## 2022-11-14 DIAGNOSIS — D21.9 BENIGN NEOPLASM OF CONNECTIVE AND OTHER SOFT TISSUE, UNSPECIFIED: ICD-10-CM

## 2022-11-14 PROCEDURE — 99213 OFFICE O/P EST LOW 20 MIN: CPT | Mod: 95

## 2022-12-04 NOTE — HISTORY OF PRESENT ILLNESS
[FreeTextEntry1] : pt consented to telemedicine visit via [employer]. she was located at her home, I was located at my [place] office.\par she had a pelvic us to fu fibroids , pt on prempro\par individual fibroids measuring larger, but overall uterine volume unchanged from 2017 us. endo lining thin. \par pt denies vb, pel pain.

## 2022-12-04 NOTE — DISCUSSION/SUMMARY
[FreeTextEntry1] : overall uterine volume unchanged x 5 years, pt on hrt. reviewed rba hrt incl incr risk thromboembolism. endo lining thin. \par spent 20 min in encounter.

## 2023-01-01 NOTE — H&P PST ADULT - HISTORY OF PRESENT ILLNESS
11.0   17.32 )-----------( 604      ( 29 Nov 2023 02:47 )             32.3     11-29    138  |  103  |  9   ----------------------------<  86  5.7<H>   |  23  |  <0.20    Ca    10.5      29 Nov 2023 02:47  Phos  6.3     11-29  Mg     2.60     11-29    TPro  6.8  /  Alb  4.8  /  TBili  0.5  /  DBili  x   /  AST  104<H>  /  ALT  107<H>  /  AlkPhos  380<H>  11-29    Respiratory Viral Panel with COVID-19 by EMMY (11.29.23 @ 02:54)    Rapid RVP Result: Detected    SARS-CoV-2: NotDetec: This Respiratory Panel uses polymerase chain reaction (PCR) to detect for  adenovirus; coronavirus (HKU1, NL63, 229E, OC43); human metapneumovirus  (hMPV); human enterovirus/rhinovirus (Entero/RV); influenza A; influenza  A/H1; influenza A/H3; influenza A/H1-2009; influenza B; parainfluenza  viruses 1, 2, 3, 4; respiratory syncytial virus; Mycoplasma pneumoniae;  Chlamydophila pneumoniae; and SARS-CoV-2.    Adenovirus (RapRVP): NotDetec    Influenza A (RapRVP): NotDetec    Influenza B (RapRVP): NotDetec    Parainfluenza 1 (RapRVP): NotDetec    Parainfluenza 2 (RapRVP): NotDetec    Parainfluenza 3 (RapRVP): NotDetec    Parainfluenza 4 (RapRVP): NotDetec    Resp Syncytial Virus (RapRVP): NotDetec    Bordetella pertussis (RapRVP): NotDetec    Bordetella parapertussis (RapRVP): NotDetec    Chlamydia pneumoniae (RapRVP): NotDetec    Mycoplasma pneumoniae (RapRVP): NotDetec    Entero/Rhinovirus (RapRVP): Detected    HKU1 Coronavirus (RapRVP): NotDetec    NL63 Coronavirus (RapRVP): NotDetec    229E Coronavirus (RapRVP): NotDetec    OC43 Coronavirus (RapRVP): NotDetec    hMPV (RapRVP): NotDetec    < from: Xray Chest 2 Views PA/Lat (11.29.23 @ 03:03) >  IMPRESSION:  Clear lungs. This is a 52 y.o female who presents to PST today.  The pt reports she has had irregular menstrual cycles and her menstrual cycles are now erratic.  She also has uterine fibroids which were demonstrated by a recent ultrasound.

## 2023-04-05 ENCOUNTER — APPOINTMENT (OUTPATIENT)
Dept: ORTHOPEDIC SURGERY | Facility: CLINIC | Age: 58
End: 2023-04-05
Payer: OTHER MISCELLANEOUS

## 2023-04-05 VITALS — BODY MASS INDEX: 31.58 KG/M2 | WEIGHT: 185 LBS | HEIGHT: 64 IN

## 2023-04-05 DIAGNOSIS — S63.502A UNSPECIFIED SPRAIN OF LEFT WRIST, INITIAL ENCOUNTER: ICD-10-CM

## 2023-04-05 DIAGNOSIS — Z78.9 OTHER SPECIFIED HEALTH STATUS: ICD-10-CM

## 2023-04-05 PROCEDURE — 99204 OFFICE O/P NEW MOD 45 MIN: CPT

## 2023-04-05 PROCEDURE — 73080 X-RAY EXAM OF ELBOW: CPT | Mod: RT

## 2023-04-05 RX ORDER — CONJUGATED ESTROGENS AND MEDROXYPROGESTERONE ACETATE .3; 1.5 MG/1; MG/1
0.3-1.5 TABLET, SUGAR COATED ORAL
Qty: 90 | Refills: 1 | Status: DISCONTINUED | COMMUNITY
Start: 2020-03-13 | End: 2023-04-05

## 2023-04-05 RX ORDER — ACYCLOVIR 50 MG/G
5 OINTMENT TOPICAL 3 TIMES DAILY
Qty: 40 | Refills: 0 | Status: DISCONTINUED | COMMUNITY
Start: 2017-09-26 | End: 2023-04-05

## 2023-04-05 RX ORDER — VALACYCLOVIR 1 G/1
1 TABLET, FILM COATED ORAL TWICE DAILY
Qty: 30 | Refills: 1 | Status: DISCONTINUED | COMMUNITY
Start: 2017-10-02 | End: 2023-04-05

## 2023-04-05 RX ORDER — ESTRADIOL/NORETHINDRONE ACETATE TRANSDERMAL SYSTEM .05; .14 MG/D; MG/D
0.05-0.14 PATCH, EXTENDED RELEASE TRANSDERMAL
Qty: 26 | Refills: 1 | Status: DISCONTINUED | COMMUNITY
Start: 2018-11-05 | End: 2023-04-05

## 2023-04-05 RX ORDER — TERCONAZOLE 4 MG/G
0.4 CREAM VAGINAL
Qty: 45 | Refills: 1 | Status: DISCONTINUED | COMMUNITY
Start: 2017-11-16 | End: 2023-04-05

## 2023-04-05 RX ORDER — SULFAMETHOXAZOLE AND TRIMETHOPRIM 800; 160 MG/1; MG/1
800-160 TABLET ORAL TWICE DAILY
Qty: 14 | Refills: 1 | Status: DISCONTINUED | COMMUNITY
Start: 2019-10-21 | End: 2023-04-05

## 2023-04-05 RX ORDER — CEPHALEXIN 250 MG/1
250 CAPSULE ORAL 3 TIMES DAILY
Qty: 21 | Refills: 0 | Status: DISCONTINUED | COMMUNITY
Start: 2017-11-03 | End: 2023-04-05

## 2023-04-05 RX ORDER — IMIQUIMOD 50 MG/G
5 CREAM TOPICAL DAILY
Qty: 30 | Refills: 1 | Status: DISCONTINUED | COMMUNITY
Start: 2018-10-03 | End: 2023-04-05

## 2023-04-05 RX ORDER — IODOQUINOL, HYDROCORTISONE ACETATE AND ALOE VERA LEAF 10; 20; 10 MG/G; MG/G; MG/G
1-2-1 GEL TOPICAL 3 TIMES DAILY
Qty: 1 | Refills: 0 | Status: DISCONTINUED | COMMUNITY
Start: 2018-08-17 | End: 2023-04-05

## 2023-04-05 RX ORDER — MISOPROSTOL 200 UG/1
200 TABLET ORAL
Qty: 3 | Refills: 0 | Status: DISCONTINUED | COMMUNITY
Start: 2017-09-26 | End: 2023-04-05

## 2023-05-15 ENCOUNTER — APPOINTMENT (OUTPATIENT)
Dept: ORTHOPEDIC SURGERY | Facility: CLINIC | Age: 58
End: 2023-05-15
Payer: OTHER MISCELLANEOUS

## 2023-05-15 DIAGNOSIS — S52.125A NONDISPLACED FRACTURE OF HEAD OF LEFT RADIUS, INITIAL ENCOUNTER FOR CLOSED FRACTURE: ICD-10-CM

## 2023-05-15 PROCEDURE — 73080 X-RAY EXAM OF ELBOW: CPT | Mod: LT

## 2023-05-15 PROCEDURE — 99214 OFFICE O/P EST MOD 30 MIN: CPT

## 2023-05-15 PROCEDURE — 73030 X-RAY EXAM OF SHOULDER: CPT | Mod: RT

## 2023-05-16 ENCOUNTER — APPOINTMENT (OUTPATIENT)
Dept: OBGYN | Facility: CLINIC | Age: 58
End: 2023-05-16
Payer: COMMERCIAL

## 2023-05-16 ENCOUNTER — NON-APPOINTMENT (OUTPATIENT)
Age: 58
End: 2023-05-16

## 2023-05-16 VITALS
SYSTOLIC BLOOD PRESSURE: 130 MMHG | HEIGHT: 64 IN | DIASTOLIC BLOOD PRESSURE: 80 MMHG | BODY MASS INDEX: 33.63 KG/M2 | WEIGHT: 197 LBS

## 2023-05-16 DIAGNOSIS — Z01.419 ENCOUNTER FOR GYNECOLOGICAL EXAMINATION (GENERAL) (ROUTINE) W/OUT ABNORMAL FINDINGS: ICD-10-CM

## 2023-05-16 DIAGNOSIS — Z78.0 ASYMPTOMATIC MENOPAUSAL STATE: ICD-10-CM

## 2023-05-16 DIAGNOSIS — N95.2 POSTMENOPAUSAL ATROPHIC VAGINITIS: ICD-10-CM

## 2023-05-16 PROCEDURE — 99213 OFFICE O/P EST LOW 20 MIN: CPT | Mod: 25

## 2023-05-16 PROCEDURE — 99396 PREV VISIT EST AGE 40-64: CPT

## 2023-05-16 PROCEDURE — 82270 OCCULT BLOOD FECES: CPT

## 2023-05-16 RX ORDER — CLOTRIMAZOLE AND BETAMETHASONE DIPROPIONATE 10; .5 MG/G; MG/G
1-0.05 CREAM TOPICAL
Qty: 1 | Refills: 2 | Status: ACTIVE | COMMUNITY
Start: 2018-08-28 | End: 1900-01-01

## 2023-05-16 NOTE — HISTORY OF PRESENT ILLNESS
[FreeTextEntry1] : 58 yo here for av, feel at work in march hasn’t been moving is gaining weight. \par  also vag dryness disc vag estrogen [Mammogramdate] : 10/22 [BreastSonogramDate] : 10/22 [BoneDensityDate] : 2021 [TextBox_37] : osteopenia [ColonoscopyDate] : yrs

## 2023-05-16 NOTE — PHYSICAL EXAM
[Chaperone Declined] : Patient declined chaperone [Appropriately responsive] : appropriately responsive [Alert] : alert [No Acute Distress] : no acute distress [No Lymphadenopathy] : no lymphadenopathy [Soft] : soft [Non-tender] : non-tender [Non-distended] : non-distended [No HSM] : No HSM [No Lesions] : no lesions [No Mass] : no mass [Oriented x3] : oriented x3 [Examination Of The Breasts] : a normal appearance [No Masses] : no breast masses were palpable [Labia Majora] : normal [Labia Minora] : normal [Normal] : normal [Uterine Adnexae] : normal [No Tenderness] : no tenderness [Nl Sphincter Tone] : normal sphincter tone [FreeTextEntry9] : -guiac-mass

## 2023-05-16 NOTE — DISCUSSION/SUMMARY
[FreeTextEntry1] : Discussed with the pt the importance of exercise weight bearing,yoga, aerobics good variety, \par calcium totalling  mg between food and vitamins also vitamin D 2000iu daily, fish oil. \par ALso that any drop of blood after menopause is not good. Encouraged SBE\par FU in one year. \par  Discussed vaginal lubricants OTC like luvena moisturizers,ky ovule and relplense . There are also vaginal estrogens, and coconut oil with intercourse.\par discussed colonoscopy and derma appt.\par vag estrogen for painful sex

## 2023-05-18 LAB — HPV HIGH+LOW RISK DNA PNL CVX: NOT DETECTED

## 2023-05-21 PROBLEM — S52.125A CLOSED NONDISPLACED FRACTURE OF HEAD OF LEFT RADIUS, INITIAL ENCOUNTER: Status: ACTIVE | Noted: 2023-04-05

## 2023-05-21 NOTE — IMAGING
[de-identified] : Right shoulder with no swelling, +ttp anterolateral. 3/5 cuff strength.\par Right shoulder radiographs with no fracture nor dislocation.\par \par RIGHT ELBOW EXAM\par +ttp at lateral epicondyle\par Skin intact\par No deformity, edema, or ecchymosis\par Hand with brisk capillary refill, warm and well perfused\par Motor function intact to AIN, PIN, ulnar nerves\par Sensation intact median, ulnar, radial nerves\par Elbow ROM\par   Flexion 135°\par   Extension to 0°\par   Supination 85°\par   Pronation 85°\par No elbow instability noted\par Hand and wrist motion is intact and full\par Patient able to make a composite fist\par \par Right elbow radiographs with no fracture nor dislocation.\par \par \par \par Left elbow and wrist with minimal swelling. +ttp laterally at elbow and fovea. Elbow arc from 5-135. Able to make fist, oppose thumb to small finger and abduct fingers. Sensation intact throughout.\par \par Left elbow radiographs with +Callus/haeled radial neck fracture.\par Left elbow MRI with nondisplaced radial neck and capitellum fracture.\par Left wrist MRI with TFCC tear.

## 2023-05-21 NOTE — HISTORY OF PRESENT ILLNESS
[de-identified] : 57F, RHD, PMHX of HTN, Thyroid presents with bilateral elbows and left wrist pain from an injury sustatined at work on 3/7/23. 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 SHe reports stepping on a crumbled piece of ashphalt and caused her to fall. Admits to going to [facility] next day, radiographs obtained of the lt elbow, lt wrist but not the rt elbow. Patient reports having MRIs done at  in [place] also. Wearing left wrist brace, denies bracing of the elbow. Denies numbness/tingling in the hands. \par \par 5/15/23: f/u right shoulder, bilateral elbow and left wrist pain, burning sensation. denies numbness/tingling. Still having a lot of pain.  did not authorize OT until 5/12/23 when submitted on 4/2723. Also with right shoulder pain and weakness.

## 2023-05-21 NOTE — ASSESSMENT
[FreeTextEntry1] : Right lateral epicondylitis/elbow contusion, left radial head and capitellum nondisplaced fracture, left wrist sprain - reviewed MRI and radiographs and overall pathoanatomy with patient. Discussed management will consist of NWB of LUE for 7 weeks from injury, ROM permitted. Will minimize bracing going forward as we're at week 4. PT for ROM. NSAIDs prn.\par \par In light of persistent right shoulder pain and weakness, will obtain right shoulder MRI without contrast to assess for cuff tear. Will followup thereafter to discuss results and develop plan of care.\par \par Patient will be full disability; guarded prognosis.\par \par F/u 4 weeks; repeat left elbow radiographs

## 2023-05-22 ENCOUNTER — APPOINTMENT (OUTPATIENT)
Dept: ORTHOPEDIC SURGERY | Facility: CLINIC | Age: 58
End: 2023-05-22
Payer: OTHER MISCELLANEOUS

## 2023-05-22 LAB — CYTOLOGY CVX/VAG DOC THIN PREP: ABNORMAL

## 2023-05-22 PROCEDURE — 99214 OFFICE O/P EST MOD 30 MIN: CPT

## 2023-05-22 PROCEDURE — 73502 X-RAY EXAM HIP UNI 2-3 VIEWS: CPT

## 2023-05-22 RX ORDER — METHYLPREDNISOLONE 4 MG/1
4 TABLET ORAL
Qty: 1 | Refills: 0 | Status: ACTIVE | COMMUNITY
Start: 2023-05-22 | End: 1900-01-01

## 2023-05-22 NOTE — HISTORY OF PRESENT ILLNESS
[de-identified] : Patient is here today for her left knee, WC. s/p fall onto hands and knees on 3/7/23.  Patient denies prior knee issues.  She states since her injury she has noted limping and Left sided hip/buttock/groin pain.  She states pain is worse getting into and out of car.  She states her Left knee pain is no better since injury and notes pain worse with stairs and pain is "deep" in the knee. \par \par Patient has a hx of GI ulcers so she needs to avoid NSAIDS.  She states she has tried muscle relaxants without relief\par She is seeing Neck specialist tomorrow

## 2023-05-22 NOTE — PHYSICAL EXAM
[Left] : left knee [Outside films reviewed] : Outside films reviewed [There are no fractures, subluxations or dislocations. No significant abnormalities are seen] : There are no fractures, subluxations or dislocations. No significant abnormalities are seen [de-identified] : Constitutional: The patient appears well developed, well nourished. Examination of patients ability to communicate functionally was normal. \par \par Neurologic: Coordination is normal. Alert and oriented to time, place and person. No evidence of mood disorder, calm affect. \par \par  LEFT     KNEE: Inspection of the knee is as follows: mild effusion. no ecchymosis, no streaking, no erythema, no atrophy, no deformities of the quad tendon and no deformities of patellar tendon. \par \par Palpation of the knee is as follows: medial joint line tenderness and prepatellar bursa and PF Joint. no obvious defects, no palpable masses, no increased warmth and no crepitus. \par \par Knee Range of Motion is as follows in degrees:\par  \par Extension: 0 \par Flexion: 125\par \par Strength examination of the knee is as follows: \par \par Quadriceps strength is 5/5\par Hamstring strength is 5/5 \par \par Ligament Stability and Special Test:  positive McMurrays test. ligamentously stable, negative anterior draw, negative Lachman test, negative posterior draw and no varus or valgus instability. patella tracks well and able to do active straight leg raise without an extensor lag. \par \par Neurological examination of the knee is as follows: light touch is intact throughout. \par \par \par Constitutional: The patient appears well developed, well nourished. Examination of patients ability to communicate functionally was normal. \par \par Neurologic: Coordination is normal. Alert and oriented to time, place and person. No evidence of mood disorder, calm affect. \par \par   LEFT    HIP/THIGH: Inspection of the hip/thigh is as follows: Inspection shows no swelling, no ecchymosis, no erythema, no rashes, no masses and no enlarged lymph nodes. \par \par Palpation of the hip/thigh is as follows: greater trochanter tenderness. no groin tenderness. \par \par Range of motion of the hip is as follows in degrees:  \par \par Flexion: 100 \par Abduction:  20 \par External rotation:  40 \par Internal rotation:  30  \par PAIN ELICITED INTO THE GROIN WITH ROM HIP\par Strength testing of the hip/thigh is as follows: \par \par Hip flexion strength:  5/5, \par Hip extension strength:  5/5 \par Hip abduction strength:   5/5  \par Hip adduction strength:   5/5. \par \par Special tests of the hip/thigh is as follows: pain in bursa with internal rotation and pain bursa with external rotation. \par \par Neurological testing of the hip/thigh is as follows: motor exam 5/5 throughout, light touch intact throughout and no focal motor defecits. \par \par \par \par

## 2023-05-22 NOTE — DISCUSSION/SUMMARY
[de-identified] : Options were discussed. PLan is for Left knee MRI to evaluate the suspected medial meniscus tear. f/u after MRI\par \par Entered by Manuel Post acting as scribe.\par Dr. Zepeda Attestation\par The documentation recorded by the scribe, in my presence, accurately reflects the service I, Dr. Zepeda, personally performed, and the decisions made by me with my edits as appropriate.

## 2023-05-25 ENCOUNTER — FORM ENCOUNTER (OUTPATIENT)
Age: 58
End: 2023-05-25

## 2023-05-31 ENCOUNTER — RESULT REVIEW (OUTPATIENT)
Age: 58
End: 2023-05-31

## 2023-05-31 NOTE — IMAGING
[de-identified] : RIGHT ELBOW EXAM\par +ttp at lateral epicondyle\par Skin intact\par No deformity, edema, or ecchymosis\par Hand with brisk capillary refill, warm and well perfused\par Motor function intact to AIN, PIN, ulnar nerves\par Sensation intact median, ulnar, radial nerves\par Elbow ROM\par   Flexion 135°\par   Extension to 0°\par   Supination 85°\par   Pronation 85°\par No elbow instability noted\par Hand and wrist motion is intact and full\par Patient able to make a composite fist\par \par Right elbow radiographs (3-view) with no fracture nor dislocation.\par \par \par \par Left elbow and wrist with minimal swelling. +ttp laterally at elbow and fovea. Elbow arc from . Able to make fist, oppose thumb to small finger and abduct fingers. Sensation intact throughout.\par \par Left elbow MRI with nondisplaced radial neck and capitellum fracture.\par Left wrist MRI with TFCC tear.

## 2023-05-31 NOTE — ASSESSMENT
[FreeTextEntry1] : Right lateral epicondylitis/elbow contusion, left radial head and capitellum nondisplaced fracture, left wrist sprain - reviewed MRI and radiographs and overall pathoanatomy with patient. Discussed management will consist of NWB of LUE for 7 weeks from injury, ROM permitted. Will minimize bracing going forward as we're at week 4. PT for ROM. NSAIDs prn.\par \par Patient will be full disability; guarded prognosis.\par \par F/u 4 weeks; repeat left elbow radiographs

## 2023-06-04 ENCOUNTER — FORM ENCOUNTER (OUTPATIENT)
Age: 58
End: 2023-06-04

## 2023-06-12 ENCOUNTER — APPOINTMENT (OUTPATIENT)
Dept: ORTHOPEDIC SURGERY | Facility: CLINIC | Age: 58
End: 2023-06-12
Payer: OTHER MISCELLANEOUS

## 2023-06-12 PROCEDURE — 99214 OFFICE O/P EST MOD 30 MIN: CPT

## 2023-06-12 PROCEDURE — 73080 X-RAY EXAM OF ELBOW: CPT | Mod: LT

## 2023-06-12 RX ORDER — PAROXETINE HYDROCHLORIDE 10 MG/1
10 TABLET, FILM COATED ORAL
Qty: 90 | Refills: 0 | Status: ACTIVE | COMMUNITY
Start: 2023-06-07

## 2023-06-12 NOTE — HISTORY OF PRESENT ILLNESS
[de-identified] : 57F, RHD, PMHX of HTN, Thyroid presents with bilateral elbows and left wrist pain from an injury sustatined at work on 3/7/23. 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 SHe reports stepping on a crumbled piece of ashphalt and caused her to fall. Admits to going to [facility] next day, radiographs obtained of the lt elbow, lt wrist but not the rt elbow. Patient reports having MRIs done at  in [place] also. Wearing left wrist brace, denies bracing of the elbow. Denies numbness/tingling in the hands. \par \par 5/15/23: f/u right shoulder, bilateral elbow and left wrist pain, burning sensation. denies numbness/tingling. Still having a lot of pain.  did not authorize OT until 5/12/23 when submitted on 4/2723. Also with right shoulder pain and weakness.\par \par 06/12/23 RT elbow and LT wrist were just approved for PT, she has had one session so far. She will be going twice a week. States trying to do anything with her RT arm is very difficult, if she can use it at all. RT arm is causing her to loose sleep, hurting worse at night. Asking for MRI of her RT elbow. \par \par

## 2023-06-12 NOTE — REASON FOR VISIT
[FreeTextEntry2] : MRI RT SHOULDER 06/06/23\par \par IMPRESSION:\par \par 1. Full-thickness tear in the anterior supraspinatus tendon spanning 1.1 cm in\par AP dimension.\par 2. Partial-thickness tearing the superior subscapularis tendon.\par 3. Infraspinatus tendinosis.\par 4. Mild to moderate glenohumeral joint effusion.\par 5. Moderate AC joint arthrosis.

## 2023-06-12 NOTE — IMAGING
[de-identified] : Right shoulder with no swelling, +ttp anterolateral. 3/5 cuff strength.\par Right shoulder radiographs with no fracture nor dislocation.\par Right shoulder MRI with supra complete tear, partial subscap.\par \par RIGHT ELBOW EXAM\par +++ttp at lateral epicondyle\par Skin intact\par No deformity, edema, or ecchymosis\par Hand with brisk capillary refill, warm and well perfused\par Motor function intact to AIN, PIN, ulnar nerves\par Sensation intact median, ulnar, radial nerves\par Elbow ROM\par   Flexion 135°\par   Extension to 0°\par   Supination 85°\par   Pronation 85°\par No elbow instability noted\par Hand and wrist motion is intact and full\par Patient able to make a composite fist\par \par Right elbow radiographs with no fracture nor dislocation.\par \par \par \par Left elbow and wrist with minimal swelling. +ttp laterally at elbow and fovea. Elbow arc from 0-135. Able to make fist, oppose thumb to small finger and abduct fingers. Sensation intact throughout.\par \par Left elbow radiographs with +Callus/healed radial neck fracture.\par Left elbow MRI with nondisplaced radial neck and capitellum fracture.\par Left wrist MRI with TFCC tear.

## 2023-06-12 NOTE — ASSESSMENT
[FreeTextEntry1] : Right lateral epicondylitis/elbow contusion, left radial head and capitellum nondisplaced fracture, left wrist sprain - reviewed MRI and radiographs and overall pathoanatomy with patient. Discussed management will consist of NWB of LUE for 7 weeks from injury, ROM permitted. Will minimize bracing going forward as we're at week 4. PT for ROM. NSAIDs prn.\par \par In light of persistent right elbow pain and weakness, will obtain right elbow MRI without contrast to assess for cuff tear. Will followup thereafter to discuss results and develop plan of care.\par \par Patient will be full disability; guarded prognosis.\par \par F/u after MRI

## 2023-06-19 ENCOUNTER — APPOINTMENT (OUTPATIENT)
Dept: ORTHOPEDIC SURGERY | Facility: CLINIC | Age: 58
End: 2023-06-19
Payer: OTHER MISCELLANEOUS

## 2023-06-19 PROCEDURE — 99215 OFFICE O/P EST HI 40 MIN: CPT

## 2023-06-19 NOTE — PHYSICAL EXAM
[de-identified] : Constitutional: The patient appears well developed, well nourished. Examination of patients ability to communicate functionally was normal. \par \par Neurologic: Coordination is normal. Alert and oriented to time, place and person. No evidence of mood disorder, calm affect. \par \par  LEFT     KNEE: Inspection of the knee is as follows: mild effusion. no ecchymosis, no streaking, no erythema, no atrophy, no deformities of the quad tendon and no deformities of patellar tendon. \par \par Palpation of the knee is as follows: medial joint line tenderness and prepatellar bursa and PF Joint. no obvious defects, no palpable masses, no increased warmth and no crepitus. \par \par Knee Range of Motion is as follows in degrees:\par  \par Extension: 0 \par Flexion: 125\par \par Strength examination of the knee is as follows: \par \par Quadriceps strength is 5/5\par Hamstring strength is 5/5 \par \par Ligament Stability and Special Test:  positive McMurrays test. ligamentously stable, negative anterior draw, negative Lachman test, negative posterior draw and no varus or valgus instability. patella tracks well and able to do active straight leg raise without an extensor lag. \par \par Neurological examination of the knee is as follows: light touch is intact throughout. \par \par \par Constitutional: The patient appears well developed, well nourished. Examination of patients ability to communicate functionally was normal. \par \par Neurologic: Coordination is normal. Alert and oriented to time, place and person. No evidence of mood disorder, calm affect. \par \par   LEFT    HIP/THIGH: Inspection of the hip/thigh is as follows: Inspection shows no swelling, no ecchymosis, no erythema, no rashes, no masses and no enlarged lymph nodes. \par \par Palpation of the hip/thigh is as follows: greater trochanter tenderness. no groin tenderness. \par \par Range of motion of the hip is as follows in degrees:  \par \par Flexion: 100 \par Abduction:  20 \par External rotation:  40 \par Internal rotation:  30  \par PAIN ELICITED INTO THE GROIN WITH ROM HIP\par Strength testing of the hip/thigh is as follows: \par \par Hip flexion strength:  5/5, \par Hip extension strength:  5/5 \par Hip abduction strength:   5/5  \par Hip adduction strength:   5/5. \par \par Special tests of the hip/thigh is as follows: pain in bursa with internal rotation and pain bursa with external rotation. \par \par Neurological testing of the hip/thigh is as follows: motor exam 5/5 throughout, light touch intact throughout and no focal motor defecits. \par \par \par \par

## 2023-06-19 NOTE — HISTORY OF PRESENT ILLNESS
[de-identified] : following up for the left knee. Patient is here today for MRI results.  Patient states the Left knee and her Left hip are still painful. She states the knee pain is diffuse and the Left hip pain is at the groin.  She notes pain is worse due to her limping.  She saw Dr Griggs for her Right shoulder and Right elbow. Dx with RCT Right shoulder.  She was told to try PT but saw a Dr Hunt who recommended surgery.

## 2023-06-19 NOTE — REASON FOR VISIT
[FreeTextEntry2] : MRI impressions L knee: \par Tearing of the medial and lateral meniscus as discussed above.\par \par Small left knee joint effusion.\par

## 2023-06-19 NOTE — DISCUSSION/SUMMARY
[de-identified] : The Risks, benefits, alternatives and expectations of the proposed procedure, as well as non-operative management, were discussed at length with the patient, as well as the procedure itself and the expected recovery period. The possible need for post operative Physical Therapy was also discussed. The patient was allowed as much tome as necessary to ask all appropriate questions and they were answered to the patient's satisfaction. \par Risks involving the knee arthroscopy were discussed with the patient and include infection, bleeding, anesthesias complications, NV injury. \par \par She needs to have the knee scoped but she may want to address the shoulder first. The patient states her Hip pain has been significant and wants to prioritize the hip. plan is to submit to w/c for MRI of hip w/o contrast and Lt Knee scope medial and lateral meniscectomy.

## 2023-07-05 ENCOUNTER — FORM ENCOUNTER (OUTPATIENT)
Age: 58
End: 2023-07-05

## 2023-08-03 ENCOUNTER — RESULT REVIEW (OUTPATIENT)
Age: 58
End: 2023-08-03

## 2023-09-05 ENCOUNTER — APPOINTMENT (OUTPATIENT)
Dept: PHYSICAL MEDICINE AND REHAB | Facility: CLINIC | Age: 58
End: 2023-09-05
Payer: OTHER MISCELLANEOUS

## 2023-09-05 ENCOUNTER — NON-APPOINTMENT (OUTPATIENT)
Age: 58
End: 2023-09-05

## 2023-09-05 VITALS
SYSTOLIC BLOOD PRESSURE: 126 MMHG | HEART RATE: 74 BPM | WEIGHT: 196 LBS | OXYGEN SATURATION: 98 % | DIASTOLIC BLOOD PRESSURE: 78 MMHG | BODY MASS INDEX: 33.46 KG/M2 | TEMPERATURE: 97.9 F | HEIGHT: 64 IN | RESPIRATION RATE: 16 BRPM

## 2023-09-05 DIAGNOSIS — Z01.818 ENCOUNTER FOR OTHER PREPROCEDURAL EXAMINATION: ICD-10-CM

## 2023-09-05 DIAGNOSIS — M77.11 LATERAL EPICONDYLITIS, RIGHT ELBOW: ICD-10-CM

## 2023-09-05 PROCEDURE — 99244 OFF/OP CNSLTJ NEW/EST MOD 40: CPT | Mod: ACP,25

## 2023-09-05 PROCEDURE — 93000 ELECTROCARDIOGRAM COMPLETE: CPT

## 2023-09-05 PROCEDURE — 36410 VNPNXR 3YR/> PHY/QHP DX/THER: CPT

## 2023-09-05 RX ORDER — AMLODIPINE BESYLATE 5 MG/1
5 TABLET ORAL TWICE DAILY
Qty: 180 | Refills: 1 | Status: ACTIVE | COMMUNITY
Start: 2023-09-05

## 2023-09-05 RX ORDER — ATORVASTATIN CALCIUM 10 MG/1
10 TABLET, FILM COATED ORAL
Qty: 90 | Refills: 1 | Status: ACTIVE | COMMUNITY
Start: 2023-09-05

## 2023-09-05 RX ORDER — LEVOTHYROXINE SODIUM 0.1 MG/1
100 TABLET ORAL DAILY
Qty: 90 | Refills: 1 | Status: ACTIVE | COMMUNITY
Start: 2023-09-05

## 2023-09-05 RX ORDER — GABAPENTIN 300 MG/1
300 CAPSULE ORAL
Qty: 90 | Refills: 1 | Status: ACTIVE | COMMUNITY
Start: 2023-09-05

## 2023-09-06 LAB
ALBUMIN SERPL ELPH-MCNC: 4.6 G/DL
ALP BLD-CCNC: 77 U/L
ALT SERPL-CCNC: 33 U/L
ANION GAP SERPL CALC-SCNC: 12 MMOL/L
APTT BLD: 30.9 SEC
AST SERPL-CCNC: 28 U/L
BASOPHILS # BLD AUTO: 0.09 K/UL
BASOPHILS NFR BLD AUTO: 1.3 %
BILIRUB SERPL-MCNC: 0.6 MG/DL
BUN SERPL-MCNC: 16 MG/DL
CALCIUM SERPL-MCNC: 9.9 MG/DL
CHLORIDE SERPL-SCNC: 104 MMOL/L
CO2 SERPL-SCNC: 27 MMOL/L
CREAT SERPL-MCNC: 0.86 MG/DL
EGFR: 78 ML/MIN/1.73M2
EOSINOPHIL # BLD AUTO: 0.14 K/UL
EOSINOPHIL NFR BLD AUTO: 2 %
GLUCOSE SERPL-MCNC: 92 MG/DL
HCT VFR BLD CALC: 43.3 %
HGB BLD-MCNC: 13.4 G/DL
IMM GRANULOCYTES NFR BLD AUTO: 0.3 %
INR PPP: 0.91 RATIO
LYMPHOCYTES # BLD AUTO: 2.32 K/UL
LYMPHOCYTES NFR BLD AUTO: 32.7 %
MAN DIFF?: NORMAL
MCHC RBC-ENTMCNC: 28.7 PG
MCHC RBC-ENTMCNC: 30.9 GM/DL
MCV RBC AUTO: 92.7 FL
MONOCYTES # BLD AUTO: 0.5 K/UL
MONOCYTES NFR BLD AUTO: 7.1 %
NEUTROPHILS # BLD AUTO: 4.02 K/UL
NEUTROPHILS NFR BLD AUTO: 56.6 %
PLATELET # BLD AUTO: 407 K/UL
POTASSIUM SERPL-SCNC: 4.2 MMOL/L
PROT SERPL-MCNC: 7 G/DL
PT BLD: 10.4 SEC
RBC # BLD: 4.67 M/UL
RBC # FLD: 13.9 %
SODIUM SERPL-SCNC: 144 MMOL/L
WBC # FLD AUTO: 7.09 K/UL

## 2023-09-06 NOTE — PLAN
[FreeTextEntry1] : EKG - NSR - 91, KELLI - 0.142, No acute T wave changes noted..  Labs Drawn by Dr. Teja Hernandez due to poor venous access.  Patient required lab testing due to conditions in their past medical history requiring periodic monitoring.  Labs were sent to [facility].  Patient to continue with present medications - all medications reconciled/reviewed during this visit and listed above.  Increase fluid intake..   labs reviewed  Patient is medically optimized at this time and may proceed with proposed procedure.  This clearance has been forwarded to Dr. Zepeda and his office has been contacted providing medical clearance and all associated documents for this patient.  At least 45 minutes was spent with patient face to face examining and reviewing findings/results during this visit. Ample time was provided to answer any questions or address concerns to the patients satisfaction..

## 2023-09-06 NOTE — HISTORY OF PRESENT ILLNESS
[FreeTextEntry1] : Medical clearance [de-identified] : Patient presents today for medical clearance requested by Dr. Zepeda prior to having left knee surgery on 9/15/23 at the [facility].  States she is still all banged up after falling when she was at work. Knees, hip, shoulder, elbows all continue to bother her.  She denies any CP, SOB or diff breathing.  No recent fever, chills, cough or cold type symptoms.  She has no other complaints at this time.

## 2023-09-06 NOTE — PHYSICAL EXAM
[No Acute Distress] : no acute distress [Well Nourished] : well nourished [Well Developed] : well developed [Well-Appearing] : well-appearing [Normal Sclera/Conjunctiva] : normal sclera/conjunctiva [PERRL] : pupils equal round and reactive to light [EOMI] : extraocular movements intact [Normal Outer Ear/Nose] : the outer ears and nose were normal in appearance [Normal Oropharynx] : the oropharynx was normal [No JVD] : no jugular venous distention [No Lymphadenopathy] : no lymphadenopathy [Supple] : supple [Thyroid Normal, No Nodules] : the thyroid was normal and there were no nodules present [No Respiratory Distress] : no respiratory distress  [No Accessory Muscle Use] : no accessory muscle use [Clear to Auscultation] : lungs were clear to auscultation bilaterally [Normal Rate] : normal rate  [Regular Rhythm] : with a regular rhythm [Normal S1, S2] : normal S1 and S2 [No Murmur] : no murmur heard [No Carotid Bruits] : no carotid bruits [No Abdominal Bruit] : a ~M bruit was not heard ~T in the abdomen [No Varicosities] : no varicosities [Pedal Pulses Present] : the pedal pulses are present [No Edema] : there was no peripheral edema [No Palpable Aorta] : no palpable aorta [No Extremity Clubbing/Cyanosis] : no extremity clubbing/cyanosis [Soft] : abdomen soft [Non Tender] : non-tender [Non-distended] : non-distended [No Masses] : no abdominal mass palpated [No HSM] : no HSM [Normal Bowel Sounds] : normal bowel sounds [Normal Posterior Cervical Nodes] : no posterior cervical lymphadenopathy [Normal Anterior Cervical Nodes] : no anterior cervical lymphadenopathy [No CVA Tenderness] : no CVA  tenderness [No Spinal Tenderness] : no spinal tenderness [No Rash] : no rash [Coordination Grossly Intact] : coordination grossly intact [No Focal Deficits] : no focal deficits [Normal Gait] : normal gait [Deep Tendon Reflexes (DTR)] : deep tendon reflexes were 2+ and symmetric [Normal Affect] : the affect was normal [Normal Insight/Judgement] : insight and judgment were intact [de-identified] : knee pain, shoulder pain, elbow pain, neck pain

## 2023-09-15 ENCOUNTER — APPOINTMENT (OUTPATIENT)
Dept: ORTHOPEDIC SURGERY | Facility: AMBULATORY SURGERY CENTER | Age: 58
End: 2023-09-15
Payer: OTHER MISCELLANEOUS

## 2023-09-15 PROCEDURE — 29880 ARTHRS KNE SRG MNISECTMY M&L: CPT | Mod: LT

## 2023-09-15 PROCEDURE — 20610 DRAIN/INJ JOINT/BURSA W/O US: CPT | Mod: 59,LT

## 2023-09-15 RX ORDER — HYDROCODONE BITARTRATE AND ACETAMINOPHEN 5; 325 MG/1; MG/1
5-325 TABLET ORAL
Qty: 25 | Refills: 0 | Status: ACTIVE | COMMUNITY
Start: 2023-09-15 | End: 1900-01-01

## 2023-09-18 DIAGNOSIS — A60.04 HERPESVIRAL VULVOVAGINITIS: ICD-10-CM

## 2023-09-18 RX ORDER — VALACYCLOVIR 500 MG/1
500 TABLET, FILM COATED ORAL TWICE DAILY
Qty: 20 | Refills: 2 | Status: ACTIVE | COMMUNITY
Start: 2023-09-18 | End: 1900-01-01

## 2023-09-25 ENCOUNTER — APPOINTMENT (OUTPATIENT)
Dept: ORTHOPEDIC SURGERY | Facility: CLINIC | Age: 58
End: 2023-09-25
Payer: OTHER MISCELLANEOUS

## 2023-09-25 PROCEDURE — 99024 POSTOP FOLLOW-UP VISIT: CPT

## 2023-10-18 ENCOUNTER — APPOINTMENT (OUTPATIENT)
Dept: ORTHOPEDIC SURGERY | Facility: CLINIC | Age: 58
End: 2023-10-18
Payer: OTHER MISCELLANEOUS

## 2023-10-18 VITALS — WEIGHT: 196 LBS | BODY MASS INDEX: 33.46 KG/M2 | HEIGHT: 64 IN

## 2023-10-18 DIAGNOSIS — M25.552 PAIN IN LEFT HIP: ICD-10-CM

## 2023-10-18 PROCEDURE — 73562 X-RAY EXAM OF KNEE 3: CPT | Mod: RT

## 2023-10-18 PROCEDURE — 99213 OFFICE O/P EST LOW 20 MIN: CPT | Mod: 24

## 2023-10-24 ENCOUNTER — APPOINTMENT (OUTPATIENT)
Dept: RADIOLOGY | Facility: CLINIC | Age: 58
End: 2023-10-24
Payer: COMMERCIAL

## 2023-10-24 ENCOUNTER — RESULT REVIEW (OUTPATIENT)
Age: 58
End: 2023-10-24

## 2023-10-24 ENCOUNTER — OUTPATIENT (OUTPATIENT)
Dept: OUTPATIENT SERVICES | Facility: HOSPITAL | Age: 58
LOS: 1 days | End: 2023-10-24
Payer: COMMERCIAL

## 2023-10-24 ENCOUNTER — APPOINTMENT (OUTPATIENT)
Dept: MAMMOGRAPHY | Facility: CLINIC | Age: 58
End: 2023-10-24
Payer: COMMERCIAL

## 2023-10-24 DIAGNOSIS — Z98.890 OTHER SPECIFIED POSTPROCEDURAL STATES: Chronic | ICD-10-CM

## 2023-10-24 DIAGNOSIS — Z12.39 ENCOUNTER FOR OTHER SCREENING FOR MALIGNANT NEOPLASM OF BREAST: ICD-10-CM

## 2023-10-24 DIAGNOSIS — Z00.00 ENCOUNTER FOR GENERAL ADULT MEDICAL EXAMINATION WITHOUT ABNORMAL FINDINGS: ICD-10-CM

## 2023-10-24 DIAGNOSIS — Z90.49 ACQUIRED ABSENCE OF OTHER SPECIFIED PARTS OF DIGESTIVE TRACT: Chronic | ICD-10-CM

## 2023-10-24 PROCEDURE — 77067 SCR MAMMO BI INCL CAD: CPT | Mod: 26

## 2023-10-24 PROCEDURE — 77063 BREAST TOMOSYNTHESIS BI: CPT

## 2023-10-24 PROCEDURE — 77063 BREAST TOMOSYNTHESIS BI: CPT | Mod: 26

## 2023-10-24 PROCEDURE — 77080 DXA BONE DENSITY AXIAL: CPT

## 2023-10-24 PROCEDURE — 77080 DXA BONE DENSITY AXIAL: CPT | Mod: 26

## 2023-10-24 PROCEDURE — 77067 SCR MAMMO BI INCL CAD: CPT

## 2023-11-13 ENCOUNTER — APPOINTMENT (OUTPATIENT)
Dept: ORTHOPEDIC SURGERY | Facility: CLINIC | Age: 58
End: 2023-11-13

## 2023-11-16 ENCOUNTER — RX RENEWAL (OUTPATIENT)
Age: 58
End: 2023-11-16

## 2023-11-16 RX ORDER — ESTRADIOL 10 UG/1
10 TABLET VAGINAL
Qty: 35 | Refills: 3 | Status: ACTIVE | COMMUNITY
Start: 2023-05-16 | End: 1900-01-01

## 2023-11-16 RX ORDER — ESTRADIOL 10 UG/1
10 TABLET, FILM COATED VAGINAL
Qty: 35 | Refills: 0 | Status: ACTIVE | COMMUNITY
Start: 2023-11-16 | End: 1900-01-01

## 2023-11-20 ENCOUNTER — APPOINTMENT (OUTPATIENT)
Dept: ORTHOPEDIC SURGERY | Facility: CLINIC | Age: 58
End: 2023-11-20
Payer: OTHER MISCELLANEOUS

## 2023-11-20 VITALS — BODY MASS INDEX: 33.46 KG/M2 | HEIGHT: 64 IN | WEIGHT: 196 LBS

## 2023-11-20 DIAGNOSIS — S59.909A UNSPECIFIED INJURY OF UNSPECIFIED ELBOW, INITIAL ENCOUNTER: ICD-10-CM

## 2023-11-20 PROCEDURE — 99213 OFFICE O/P EST LOW 20 MIN: CPT

## 2024-03-06 ENCOUNTER — APPOINTMENT (OUTPATIENT)
Dept: ORTHOPEDIC SURGERY | Facility: CLINIC | Age: 59
End: 2024-03-06
Payer: OTHER MISCELLANEOUS

## 2024-03-06 DIAGNOSIS — S73.192A OTHER SPRAIN OF LEFT HIP, INITIAL ENCOUNTER: ICD-10-CM

## 2024-03-06 DIAGNOSIS — S83.241A OTHER TEAR OF MEDIAL MENISCUS, CURRENT INJURY, RIGHT KNEE, INITIAL ENCOUNTER: ICD-10-CM

## 2024-03-06 DIAGNOSIS — S83.242A OTHER TEAR OF MEDIAL MENISCUS, CURRENT INJURY, LEFT KNEE, INITIAL ENCOUNTER: ICD-10-CM

## 2024-03-06 PROCEDURE — 99214 OFFICE O/P EST MOD 30 MIN: CPT

## 2024-03-06 NOTE — PHYSICAL EXAM
[de-identified] : Constitutional: The patient appears well developed, well nourished. Examination of patients ability to communicate functionally was normal.       Neurologic: Coordination is normal. Alert and oriented to time, place and person. No evidence of mood disorder, calm affect.          RIGHT   KNEE: Inspection of the knee is as follows: mild effusion. no ecchymosis, no streaking, no erythema, no atrophy, no deformities of the quad tendon and no deformities of patellar tendon.       Palpation of the knee is as follows: medial joint line tenderness. no obvious defects, no palpable masses, no increased warmth and no crepitus.       Knee Range of Motion is as follows in degrees:       Extension: 0    Flexion: 125      Strength examination of the knee is as follows:       Quadriceps strength is 5/5   Hamstring strength is 5/5       Ligament Stability and Special Test:  positive McMurrays test. ligamentously stable, negative anterior draw, negative Lachman test, negative posterior draw and no varus or valgus instability. patella tracks well and able to do active straight leg raise without an extensor lag.       Neurological examination of the knee is as follows: light touch is intact throughout.       Gait and function is as follows: non-antalgic gait.   Constitutional: The patient appears well developed, well nourished. Examination of patients ability to communicate functionally was normal.       Neurologic: Coordination is normal. Alert and oriented to time, place and person. No evidence of mood disorder, calm affect.

## 2024-03-06 NOTE — HISTORY OF PRESENT ILLNESS
[de-identified] : following up for bilateral knees. Patient is s/p L knee scope partial medial and lateral meniscectomy 9/15/2023. Patient states both the right knee and left knee are still bothering her on a daily basis. She states the left knee is worse than the right knee.  She states she has a hearing on the 18th of this month to add the right knee to her W/C case.  Patient states the Left knee was doing great until about 2 mos ago.  She notes pain posterior and anterolateral knee.  She feels the Right kene is painful as well and she feels both knees will give out She never had MRI Right knee.

## 2024-03-06 NOTE — DISCUSSION/SUMMARY
[de-identified] : Options were discussed. She states she has a hearing on the 18th of this month to have the left hip and right knee added to her case. When she was first seen by us she stated both knees were injured on that DOI at work. She states she is having shoulder surgery with an outside facility. She was not able to get the MRI of the right knee due to it not being established on her case. I feel both knees and the left hip are all related to the injury that occurred at work. At this time I would recommend PT for the left knee that was scoped on 9/15/2023. She states she is having shoulder surgery with an outside office soon and would like to take care of the shoulder first before doing the left knee PT. She will let us know if the right knee and left hip are added to her case. She will f/u after her shoulder surgery for documentation of the left knee and to submit for auth for PT.

## 2024-03-12 ENCOUNTER — APPOINTMENT (OUTPATIENT)
Dept: PHYSICAL MEDICINE AND REHAB | Facility: CLINIC | Age: 59
End: 2024-03-12
Payer: OTHER MISCELLANEOUS

## 2024-03-12 VITALS
RESPIRATION RATE: 16 BRPM | TEMPERATURE: 97.3 F | HEIGHT: 64 IN | BODY MASS INDEX: 33.46 KG/M2 | SYSTOLIC BLOOD PRESSURE: 122 MMHG | DIASTOLIC BLOOD PRESSURE: 70 MMHG | HEART RATE: 86 BPM | OXYGEN SATURATION: 98 % | WEIGHT: 196 LBS

## 2024-03-12 DIAGNOSIS — E03.9 HYPOTHYROIDISM, UNSPECIFIED: ICD-10-CM

## 2024-03-12 DIAGNOSIS — Z01.818 ENCOUNTER FOR OTHER PREPROCEDURAL EXAMINATION: ICD-10-CM

## 2024-03-12 DIAGNOSIS — I10 ESSENTIAL (PRIMARY) HYPERTENSION: ICD-10-CM

## 2024-03-12 DIAGNOSIS — M75.100 UNSPECIFIED ROTATOR CUFF TEAR OR RUPTURE OF UNSPECIFIED SHOULDER, NOT SPECIFIED AS TRAUMATIC: ICD-10-CM

## 2024-03-12 DIAGNOSIS — M25.519 PAIN IN UNSPECIFIED SHOULDER: ICD-10-CM

## 2024-03-12 DIAGNOSIS — E78.00 PURE HYPERCHOLESTEROLEMIA, UNSPECIFIED: ICD-10-CM

## 2024-03-12 DIAGNOSIS — J45.20 MILD INTERMITTENT ASTHMA, UNCOMPLICATED: ICD-10-CM

## 2024-03-12 PROCEDURE — 93000 ELECTROCARDIOGRAM COMPLETE: CPT

## 2024-03-12 PROCEDURE — 99244 OFF/OP CNSLTJ NEW/EST MOD 40: CPT | Mod: ACP

## 2024-03-12 NOTE — PHYSICAL EXAM
[Well Nourished] : well nourished [No Acute Distress] : no acute distress [Well Developed] : well developed [Well-Appearing] : well-appearing [Normal Sclera/Conjunctiva] : normal sclera/conjunctiva [PERRL] : pupils equal round and reactive to light [EOMI] : extraocular movements intact [Normal Outer Ear/Nose] : the outer ears and nose were normal in appearance [No JVD] : no jugular venous distention [Normal Oropharynx] : the oropharynx was normal [No Lymphadenopathy] : no lymphadenopathy [Thyroid Normal, No Nodules] : the thyroid was normal and there were no nodules present [Supple] : supple [No Accessory Muscle Use] : no accessory muscle use [No Respiratory Distress] : no respiratory distress  [Normal Rate] : normal rate  [Clear to Auscultation] : lungs were clear to auscultation bilaterally [Regular Rhythm] : with a regular rhythm [Normal S1, S2] : normal S1 and S2 [No Murmur] : no murmur heard [No Carotid Bruits] : no carotid bruits [No Abdominal Bruit] : a ~M bruit was not heard ~T in the abdomen [No Varicosities] : no varicosities [Pedal Pulses Present] : the pedal pulses are present [No Edema] : there was no peripheral edema [No Palpable Aorta] : no palpable aorta [Soft] : abdomen soft [No Extremity Clubbing/Cyanosis] : no extremity clubbing/cyanosis [Non Tender] : non-tender [Non-distended] : non-distended [No HSM] : no HSM [No Masses] : no abdominal mass palpated [Normal Posterior Cervical Nodes] : no posterior cervical lymphadenopathy [Normal Bowel Sounds] : normal bowel sounds [Normal Anterior Cervical Nodes] : no anterior cervical lymphadenopathy [No CVA Tenderness] : no CVA  tenderness [No Spinal Tenderness] : no spinal tenderness [No Joint Swelling] : no joint swelling [Grossly Normal Strength/Tone] : grossly normal strength/tone [No Rash] : no rash [Coordination Grossly Intact] : coordination grossly intact [No Focal Deficits] : no focal deficits [Deep Tendon Reflexes (DTR)] : deep tendon reflexes were 2+ and symmetric [Normal Gait] : normal gait [Normal Affect] : the affect was normal [Normal Insight/Judgement] : insight and judgment were intact

## 2024-03-20 NOTE — HISTORY OF PRESENT ILLNESS
[FreeTextEntry1] : Medical clearance  [de-identified] : Patient encounter today for medical clearance requested by Dr. Olson for right shoulder surgery scheduled on 3/21/24 at [facility].   States they have been doing well.  Denies any CP, SOB or diff breathing.  No recent fever, chills, cough or cold type symptoms. No recent LOC or head trauma. Denies a history of seizure disorder. Denies lose bridges, caps, or dentures. Denies complications with anesthesia in the past. Patient had a physical within the last year.  Has no other complaints at this time. Pt PST 3/14.

## 2024-03-20 NOTE — PLAN
[FreeTextEntry1] : EKG - NSR - 76, KELLI - 0.134, No acute T wave changes noted..   Labs reviewed - K = 3.3, Plts = 422 - does not prevent this patient from moving forward with procedure. Discussed and reviewed current medications as follows;  Amlodipine- HTN, Atorvastatin-HLD, Levothyroxine-hypothyroidism Patient to continue with present medications - all medications reconciled/reviewed during this visit and listed above. Increase fluids  Patient is medically optimized at this time and may proceed with proposed procedure.  This clearance has been forwarded to Dr. Olsno and his office has been contacted providing medical clearance and all associated documents for this patient.  At least  45 minutes was spent with patient face to face examining and reviewing findings/results during this visit. Ample time was provided to answer any questions or address concerns to the patients satisfaction..  I, Ginna Crystal, attest that this documentation has been prepared under the direction and in the presence of Provider Teja Hernandez DNP The documentation recorded by the scribe, in my presence, accurately reflects the service I personally performed, and the decisions made by me with my edits as appropriate. Teja Hernandez DNP

## 2024-03-26 ENCOUNTER — TRANSCRIPTION ENCOUNTER (OUTPATIENT)
Age: 59
End: 2024-03-26

## 2024-08-29 ENCOUNTER — APPOINTMENT (OUTPATIENT)
Dept: OBGYN | Facility: CLINIC | Age: 59
End: 2024-08-29
Payer: COMMERCIAL

## 2024-08-29 VITALS
SYSTOLIC BLOOD PRESSURE: 128 MMHG | BODY MASS INDEX: 32.44 KG/M2 | DIASTOLIC BLOOD PRESSURE: 77 MMHG | WEIGHT: 190 LBS | HEIGHT: 64 IN

## 2024-08-29 DIAGNOSIS — Z78.0 ASYMPTOMATIC MENOPAUSAL STATE: ICD-10-CM

## 2024-08-29 DIAGNOSIS — Z01.419 ENCOUNTER FOR GYNECOLOGICAL EXAMINATION (GENERAL) (ROUTINE) W/OUT ABNORMAL FINDINGS: ICD-10-CM

## 2024-08-29 PROCEDURE — 99396 PREV VISIT EST AGE 40-64: CPT

## 2024-08-29 NOTE — DISCUSSION/SUMMARY
[FreeTextEntry1] : Discussed with the pt the importance of exercise weight bearing,yoga, aerobics good variety,  calcium totalling  mg between food and vitamins also vitamin D 2000iu daily, fish oil.  ALso that any drop of blood after menopause is not good. Encouraged SBE FU in one year.   Discussed vaginal lubricants OTC like luvena moisturizers,ky ovule and relplense .continue with the  vaginal estrogens, and coconut oil with intercourse. discussed colonoscopy did the cologaurd will follow with pmdand derma appt.

## 2024-08-29 NOTE — HISTORY OF PRESENT ILLNESS
[FreeTextEntry1] : 57 yo here for av she has some occ vulvar itch, she also is very happy with the vaginal estrogen. SHe wishes to continue. , no  bleeding  [Patient reported bone density results were abnormal] : Patient reported bone density results were abnormal [BoneDensityDate] : 2023 [TextBox_37] : osteopenia [ColonoscopyDate] : cologaurd 2024

## 2024-09-03 LAB — HPV HIGH+LOW RISK DNA PNL CVX: NOT DETECTED

## 2024-09-04 LAB — CYTOLOGY CVX/VAG DOC THIN PREP: NORMAL

## 2025-02-13 ENCOUNTER — RX RENEWAL (OUTPATIENT)
Age: 60
End: 2025-02-13

## 2025-03-17 ENCOUNTER — RX RENEWAL (OUTPATIENT)
Age: 60
End: 2025-03-17